# Patient Record
Sex: FEMALE | Race: WHITE | NOT HISPANIC OR LATINO | Employment: OTHER | URBAN - METROPOLITAN AREA
[De-identification: names, ages, dates, MRNs, and addresses within clinical notes are randomized per-mention and may not be internally consistent; named-entity substitution may affect disease eponyms.]

---

## 2017-02-15 ENCOUNTER — LAB CONVERSION - ENCOUNTER (OUTPATIENT)
Dept: OTHER | Facility: OTHER | Age: 67
End: 2017-02-15

## 2017-02-15 LAB
CHOLEST SERPL-MCNC: 181 MG/DL (ref 125–200)
CHOLEST/HDLC SERPL: 3.9 (CALC)
HDLC SERPL-MCNC: 47 MG/DL
LDL CHOLESTEROL (HISTORICAL): 104 MG/DL (CALC)
NON-HDL-CHOL (CHOL-HDL) (HISTORICAL): 134 MG/DL (CALC)
TRIGL SERPL-MCNC: 151 MG/DL

## 2017-02-16 ENCOUNTER — ALLSCRIPTS OFFICE VISIT (OUTPATIENT)
Dept: OTHER | Facility: OTHER | Age: 67
End: 2017-02-16

## 2017-02-27 ENCOUNTER — GENERIC CONVERSION - ENCOUNTER (OUTPATIENT)
Dept: OTHER | Facility: OTHER | Age: 67
End: 2017-02-27

## 2017-06-29 ENCOUNTER — HOSPITAL ENCOUNTER (OUTPATIENT)
Dept: RADIOLOGY | Facility: HOSPITAL | Age: 67
Discharge: HOME | End: 2017-06-29
Attending: OBSTETRICS & GYNECOLOGY
Payer: COMMERCIAL

## 2017-06-29 DIAGNOSIS — Z12.31 ENCOUNTER FOR SCREENING MAMMOGRAM FOR MALIGNANT NEOPLASM OF BREAST: ICD-10-CM

## 2017-06-29 PROCEDURE — G0202 SCR MAMMO BI INCL CAD: HCPCS

## 2017-09-01 LAB
A/G RATIO (HISTORICAL): 1.8 (CALC) (ref 1–2.5)
ALBUMIN SERPL BCP-MCNC: 4.4 G/DL (ref 3.6–5.1)
ALP SERPL-CCNC: 67 U/L (ref 33–130)
ALT SERPL W P-5'-P-CCNC: 24 U/L (ref 6–29)
AST SERPL W P-5'-P-CCNC: 18 U/L (ref 10–35)
BILIRUB SERPL-MCNC: 0.7 MG/DL (ref 0.2–1.2)
BUN SERPL-MCNC: 12 MG/DL (ref 7–25)
BUN/CREA RATIO (HISTORICAL): ABNORMAL (CALC) (ref 6–22)
CALCIUM (ADJUSTED FOR ALBUMIN) (HISTORICAL): 9.5 MG/DL (CALC) (ref 8.6–10.2)
CALCIUM SERPL-MCNC: 9.5 MG/DL (ref 8.6–10.4)
CHLORIDE SERPL-SCNC: 103 MMOL/L (ref 98–110)
CHOLEST SERPL-MCNC: 210 MG/DL
CHOLEST/HDLC SERPL: 3.8 (CALC)
CO2 SERPL-SCNC: 27 MMOL/L (ref 20–31)
CREAT SERPL-MCNC: 0.74 MG/DL (ref 0.5–0.99)
EGFR AFRICAN AMERICAN (HISTORICAL): 98 ML/MIN/1.73M2
EGFR-AMERICAN CALC (HISTORICAL): 84 ML/MIN/1.73M2
EST. AVERAGE GLUCOSE BLD GHB EST-MCNC: 114 (CALC)
EST. AVERAGE GLUCOSE BLD GHB EST-MCNC: 6.3 (CALC)
GAMMA GLOBULIN (HISTORICAL): 2.4 G/DL (CALC) (ref 1.9–3.7)
GLUCOSE (HISTORICAL): 109 MG/DL (ref 65–99)
HBA1C MFR BLD HPLC: 5.6 % OF TOTAL HGB
HDLC SERPL-MCNC: 56 MG/DL
LDL CHOLESTEROL (HISTORICAL): 123 MG/DL (CALC)
NON-HDL-CHOL (CHOL-HDL) (HISTORICAL): 154 MG/DL (CALC)
POTASSIUM SERPL-SCNC: 3.8 MMOL/L (ref 3.5–5.3)
SODIUM SERPL-SCNC: 141 MMOL/L (ref 135–146)
TOTAL PROTEIN (HISTORICAL): 6.8 G/DL (ref 6.1–8.1)
TRIGL SERPL-MCNC: 185 MG/DL
TSH SERPL DL<=0.05 MIU/L-ACNC: 0.99 MIU/L (ref 0.4–4.5)

## 2017-09-06 ENCOUNTER — ALLSCRIPTS OFFICE VISIT (OUTPATIENT)
Dept: OTHER | Facility: OTHER | Age: 67
End: 2017-09-06

## 2017-10-20 ENCOUNTER — ALLSCRIPTS OFFICE VISIT (OUTPATIENT)
Dept: OTHER | Facility: OTHER | Age: 67
End: 2017-10-20

## 2017-10-20 PROCEDURE — G0145 SCR C/V CYTO,THINLAYER,RESCR: HCPCS | Performed by: OBSTETRICS & GYNECOLOGY

## 2017-10-21 NOTE — PROGRESS NOTES
Assessment  1  Pap smear, as part of routine gynecological examination (V76 2) (Z01 419)   2  Encounter for screening mammogram for breast cancer (V76 12) (Z12 31)    Pelvic exam reveals no masses in the pelvis  The vaginal cuff is well supported but there is a cystocele rectocele present  There is no blood or discharge in the vagina  The vulva is normal  Rectal exam shows no masses or blood in the rectum and no nodularity in the cul-de-sac  Breast exam is normal      Plan  Encounter for screening mammogram for breast cancer    · Follow-up visit in 1 year Evaluation and Treatment  Follow-up  Status: Hold For -  Scheduling  Requested for: 37WGL8566   Ordered; For: Encounter for screening mammogram for breast cancer; Ordered By: Yesika Moser Performed:  Due: 15ZAJ2929  Encounter for screening mammogram for malignant neoplasm of breast    · * MAMMO SCREENING BILATERAL W CAD; Status:Active - Retrospective Authorization; Requested YCR:[id number]; Perform:[facility]; NMW:[id number]; Last Updated Vick Cunha; 10/20/2017 10:37:19 AM;Ordered;For:Encounter for screening mammogram for malignant neoplasm of breast; Ordered By:Mykel Flores;  Pap smear, as part of routine gynecological examination    · (1) THIN PREP PAP WITH IMAGING; Status:Active - Retrospective Authorization; Requested ROYCE:[id number];    Perform:[facility]; WDB:[id number]; Last Updated By:Sherry Liu; 10/20/2017 11:07:37 AM;Ordered; For:Pap smear, as part of routine gynecological examination; Ordered By:Mykel Flores; Maturation index required? : No  : hysterectomy  HPV? : if ASCUS    A vaginal Pap smear was taken at this visit  The patient was given a slip for bilateral screening mammogram to be performed in June or July 2018  She can return in 2 years for follow-up GYN evaluation  Discussion/Summary    The patient was told that her breast exam is normal and that she does have a cystocele rectocele        Chief Complaint  annual exam no problems      History of Present Illness  HPI: This 72-year-old patient has had no vaginal bleeding or episodes of vaginitis over the past year  She has no chronic headaches or fainting spells  She does have occasional hot flashes  Her bowel function is normal bladder function is normal  She does not wear liners or pads every day  She does help take care of 80-year-old mother 20-year-old mother-in-law  GYN HM, Adult Female  Candice:   General Health:   Lifestyle:  She consumes alcohol -- She denies drug use  Reproductive health:  she is not sexually active  -- pregnancy history: G 2P 2,-- 2--   hysterectomy  Screening: Cervical cancer screening includes a pap smear performed 10/9/2015  Breast cancer screening includes a mammogram performed 6/29/2017  Colorectal cancer screening includes a colonoscopy performed 8/13/2012  Review of Systems    Constitutional: No fever, no chills, feels well, no tiredness, no recent weight gain or loss  ENT: no ear ache, no loss of hearing, no nosebleeds or nasal discharge, no sore throat or hoarseness  Cardiovascular: no complaints of slow or fast heart rate, no chest pain, no palpitations, no leg claudication or lower extremity edema  Respiratory: no complaints of shortness of breath, no wheezing, no dyspnea on exertion, no orthopnea or PND  Breasts: no complaints of breast pain, breast lump or nipple discharge  Gastrointestinal: no complaints of abdominal pain, no constipation, no nausea or diarrhea, no vomiting, no bloody stools  Genitourinary: no complaints of dysuria, no incontinence, no pelvic pain, no dysmenorrhea, no vaginal discharge or abnormal vaginal bleeding  Musculoskeletal: no complaints of arthralgia, no myalgia, no joint swelling or stiffness, no limb pain or swelling  Integumentary: no complaints of skin rash or lesion, no itching or dry skin, no skin wounds     Neurological: no complaints of headache, no confusion, no numbness or tingling, no dizziness or fainting  Active Problems  1  Allergic rhinitis (477 9) (J30 9)   2  Diverticulosis (562 10) (K57 90)   3  Encounter for screening mammogram for breast cancer (V76 12) (Z12 31)   4  Encounter for screening mammogram for malignant neoplasm of breast (V76 12)   (Z12 31)   5  Esophageal reflux (530 81) (K21 9)   6  Hyperlipidemia (272 4) (E78 5)   7  Hypertension (401 9) (I10)   8  Hypothyroidism (244 9) (E03 9)   9  Impaired fasting glucose (790 21) (R73 01)   10  Mild intermittent asthma without complication (154 78) (L47 43)   11  Numbness and tingling of foot (782 0) (R20 2)   12  Pap smear, as part of routine gynecological examination (V76 2) (Z01 419)   13   Welcome to Medicare preventive visit (V70 0) (Z00 00)    Past Medical History   · History of Abdominal pain (789 00) (R10 9)   · History of Abnormal EKG (794 31) (R94 31)   · History of Acute sinusitis (461 9) (J01 90)   · History of Chest skin lesion (709 9) (L98 9)   · History of Contact dermatitis due to poison ivy (692 6) (L23 7)   · History of Depression with anxiety (300 4) (F41 8)   · History of Fracture Of The Patella (822 0)   · History of Hearing Loss (389 9)   · History of acute bronchitis (V12 69) (Z87 09)   · History of acute bronchitis with bronchospasm (V12 69) (Z87 09)   · History of bronchitis (V12 69) (Z87 09)   · History of eczema (V13 3) (Z87 2)   · History of folliculitis (Q07 1) (U49 1)   · History of streptococcal pharyngitis (V12 09) (Z87 09)   · History of Limb swelling (729 81) (M79 89)   · History of Patient denies drug use   · History of Plantar fasciitis (728 71) (M72 2)   · History of Previous Pregnancies Resulted In 2  Living Children   · History of Shoulder joint pain, unspecified laterality   · History of Skin lesion (709 9) (L98 9)    Surgical History   · History of Colonoscopy (Fiberoptic)   · History of Complete Colonoscopy   · History of Diagnostic Cystoscopy   · History of Hysterectomy   · History of Laminectomy Lumbar    Family History  Mother    · Family history of Coronary Artery Disease (V17 49)   · Denied: Family history of Depression   · Family history of Hypertension (V17 49)   · Family history of Irritable Bowel Syndrome   · No family history of alcoholism (V49 89) (Z78 9)   · Denied: Family history of Patient denies drug use  Father    · Denied: Family history of Depression   · Family history of Diabetes Mellitus (V18 0)   · Family history of Hypertension (V17 49)   · Family history of Lung Cancer (V16 1)   · No family history of alcoholism (V49 89) (Z78 9)   · Denied: Family history of Patient denies drug use   · Family history of Serum Total Cholesterol Was Elevated  Child    · No family history of alcoholism (V49 89) (Z78 9)   · Denied: Family history of Patient denies drug use  Sibling    · No family history of alcoholism (V49 89) (Z78 9)   · Denied: Family history of Patient denies drug use  Brother    · Denied: Family history of Anxiety (Symptom)   · Family history of Colon Cancer (V16 0)   · Denied: Family history of Depression  Family History    · Family history of Colon Cancer (V16 0)   · Family history of Diabetes Mellitus (V18 0)    Social History   · Alcohol   · 2 glasses wine/daily   · Being A Social Drinker   · Former smoker (P43 23) (I15 283)   · Two children    Current Meds   1  Advair Diskus 250-50 MCG/DOSE Inhalation Aerosol Powder Breath Activated; INHALE   1 PUFF TWICE DAILY PRN  RINSE MOUTH AFTER USE; Therapy: 58NQK7515 to (Evaluate:96Fbv3726)  Requested for: 20KSC5765 Recorded   2  Aspirin 81 MG TABS; TABS PO;   Therapy: 08GAZ3614 to (Last Rx:11Oct2014) Ordered   3  Aspirin EC 81 MG Oral Tablet Delayed Release; take 1 tab daily; Therapy: 32XUT3659 to Recorded   4  Calcium 600+D 600-400 MG-UNIT Oral Tablet; take 1 tab daily; Therapy: 61RMH6905 to (Evaluate:05Jan2015) Recorded   5  Fish Oil 1200 MG Oral Capsule; take 1 caps  BID;    Therapy: (Recorded:01Cxj5145) to Recorded   6  Levothyroxine Sodium 112 MCG Oral Tablet; Take 1 tablet daily; Therapy: 48YBE7443 to (Last Rx:55Wgg9681)  Requested for: 64Phh6296 Ordered   7  Losartan Potassium-HCTZ 100-12 5 MG Oral Tablet; Take 1 tablet daily; Therapy: 89CIQ1418 to (Last Iona Pink)  Requested for: 13PTD2241 Ordered   8  PriLOSEC OTC 20 MG Oral Tablet Delayed Release; take 1 tablet daily prn; Therapy: 15VYL7352 to (Last Rx:87Zcr3315) Ordered   9  ProAir  (90 Base) MCG/ACT Inhalation Aerosol Solution; inhale 2 puffs every 4   hours as needed for cough and wheeze; Therapy: 43IGB9342 to (Evaluate:54Dsy4520)  Requested for: 26Czo0149; Last   Rx:49Ftx2726 Ordered   10  Red Yeast Rice 600 MG Oral Capsule; take 1 caps  BID; Last Rx:91Vaw5221 Ordered   11  Vitamin B Complex Oral Tablet; TAKE 1 TABLET DAILY; Therapy: 18UKR4939 to (Evaluate:13Mar2016); Last Rx:61Jfv5900 Ordered   12  Vitamin C 500 MG Oral Tablet Recorded   13  ZyrTEC Allergy 10 MG Oral Tablet; TAKE 1 TABLET DAILY; Therapy: (Recorded:32Suw2782) to Recorded    Allergies  1  No Known Drug Allergies    Vitals   Recorded: 09PEX5273 18:36QA   Systolic 237   Diastolic 78   Height 5 ft 7 in   Weight 206 lb    BMI Calculated 32 26   BSA Calculated 2 05   LMP hysterectomy     Physical Exam    Constitutional   General appearance: No acute distress, well appearing and well nourished  Neck   Neck: Normal, supple, trachea midline, no masses  Thyroid: Normal, no thyromegaly  Pulmonary   Respiratory effort: No increased work of breathing or signs of respiratory distress  Auscultation of lungs: Clear to auscultation  Cardiovascular   Auscultation of heart: Normal rate and rhythm, normal S1 and S2, no murmurs  Peripheral vascular exam: Normal pulses Throughout  Genitourinary   External genitalia: Normal and no lesions appreciated  Vagina: Abnormal  -- Cystocele and rectocele introitus     Urethra: Normal     Urethral meatus: Normal     Bladder: Normal, soft, non-tender and no prolapse or masses appreciated  Cervix: Surgically absent  Uterus: Surgically absent  Adnexa/parametria: Normal, non-tender and no fullness or masses appreciated  Anus, perineum, and rectum: Normal sphincter tone, no masses, and no prolapse  Chest   Breasts: Normal and no dimpling or skin changes noted  Abdomen   Abdomen: Normal, non-tender, and no organomegaly noted  Liver and spleen: No hepatomegaly or splenomegaly  Examination for hernias: No hernias appreciated  Lymphatic   Palpation of lymph nodes in neck, axillae, groin and/or other locations: No lymphadenopathy or masses noted  Skin   Skin and subcutaneous tissue: Normal skin turgor and no rashes  Palpation of skin and subcutaneous tissue: Normal     Psychiatric   Orientation to person, place, and time: Normal     Mood and affect: Normal        Future Appointments    Date/Time Provider Specialty Site   03/07/2018 09:00 AM RHIANNA Combs   Family Medicine [address] [place] [address]     Signatures   Electronically signed by : Ryan Arce MD; Oct 20 2017 11:32AM EST                       (Author)

## 2017-10-22 ENCOUNTER — LAB REQUISITION (OUTPATIENT)
Dept: LAB | Facility: HOSPITAL | Age: 67
End: 2017-10-22
Payer: COMMERCIAL

## 2017-10-22 DIAGNOSIS — Z01.419 ENCOUNTER FOR GYNECOLOGICAL EXAMINATION WITHOUT ABNORMAL FINDING: ICD-10-CM

## 2017-10-30 LAB
LAB AP GYN PRIMARY INTERPRETATION: NORMAL
Lab: NORMAL

## 2017-10-31 ENCOUNTER — GENERIC CONVERSION - ENCOUNTER (OUTPATIENT)
Dept: OTHER | Facility: OTHER | Age: 67
End: 2017-10-31

## 2018-01-09 NOTE — RESULT NOTES
Verified Results  (1) THIN PREP PAP WITH IMAGING 52GJO2193 05:20PM Ki Silverman     Test Name Result Flag Reference   LAB AP CASE REPORT (Report)     Gynecologic Cytology Report            Case: [id number]                  Authorizing Provider: Audi Ugalde MD     Collected:      10/20/2017           First Screen:     PATRICIO Khalil    Received:      10/23/2017 1116        Specimen:  LIQUID-BASED PAP, SCREENING, Vaginal   LAB AP GYN PRIMARY INTERPRETATION      Negative for intraepithelial lesion or malignancy  Electronically signed by PATRICIO Khalil on 10/30/2017 at 5:20 PM   LAB AP GYN SPECIMEN ADEQUACY      Satisfactory for evaluation  Endocervical/transformation zone component present  LAB AP GYN ADDITIONAL INFORMATION (Report)     [employer]'s FDA approved ,  and ThinPrep Imaging System are   utilized with strict adherence to the 's instruction manual to   prepare gynecologic and non-gynecologic cytology specimens for the   production of ThinPrep slides as well as for gynecologic ThinPrep imaging  These processes have been validated by our laboratory and/or by the     The Pap test is not a diagnostic procedure and should not be used as the   sole means to detect cervical cancer  It is only a screening procedure to   aid in the detection of cervical cancer and its precursors  Both   false-negative and false-positive results have been experienced  Your   patient's test result should be interpreted in this context together with   the history and clinical findings     LAB AP LMP hysterectomy     hysterectomy

## 2018-01-11 NOTE — RESULT NOTES
Verified Results  * MAMMO SCREENING BILATERAL W CAD 50UZF4253 02:14PM Enma Hobson Order Number: [id number]    - Patient Instructions: To schedule this appointment, please contact Central Scheduling at [phone]  Do not wear any perfume, powder, lotion or deodorant on breast or underarm area  Please bring your doctors order, referral (if needed) and insurance information with you on the day of the test  Failure to bring this information may result in this test being rescheduled  Arrive 15 minutes prior to your appointment time to register  On the day of your test, please bring any prior mammogram or breast studies with you that were not performed at a [facility]  Failure to bring prior exams may result in your test needing to be rescheduled   Order Number: [id number]    - Patient Instructions: To schedule this appointment, please contact Central Scheduling at [phone]  Do not wear any perfume, powder, lotion or deodorant on breast or underarm area  Please bring your doctors order, referral (if needed) and insurance information with you on the day of the test  Failure to bring this information may result in this test being rescheduled  Arrive 15 minutes prior to your appointment time to register  On the day of your test, please bring any prior mammogram or breast studies with you that were not performed at a [facility]  Failure to bring prior exams may result in your test needing to be rescheduled   Order Number: [id number]    - Patient Instructions: To schedule this appointment, please contact Central Scheduling at [phone]  Do not wear any perfume, powder, lotion or deodorant on breast or underarm area  Please bring your doctors order, referral (if needed) and insurance information with you on the day of the test  Failure to bring this information may result in this test being rescheduled   Arrive 15 minutes prior to your appointment time to register  On the day of your test, please bring any prior mammogram or breast studies with you that were not performed at a [facility]  Failure to bring prior exams may result in your test needing to be rescheduled  Test Name Result Flag Reference   MAMMO SCREENING BILATERAL W CAD (Report)     Patient History:   Patient is postmenopausal    No known family history of cancer  Patient is a former smoker, and smoked for 25 years  Patient's    BMI is 30 4  Reason for exam: screening, asymptomatic  Mammo Screening Bilateral W CAD: June 29, 2017 - Check In #:    [de-identified]   Bilateral MLO and CC view(s) were taken  Technologist: RT Wesley(R)(M)   Prior study comparison: October 9, 2015, bilateral digital    screening mammogram performed at [address] [place] [address]     Daniella 3, 2013, bilateral digital screening mammogram performed at   [address] [place] [address]      The breast tissue is almost entirely fat  No dominant soft tissue mass, architectural distortion or    suspicious calcifications are noted  The skin and nipple    contours are within normal limits  No evidence of malignancy  No significant changes   when compared with prior studies  ACR BI-RADSï¾® Assessments: BiRad:1 - Negative     Recommendation:   Routine screening mammogram of both breasts in 1 year  A    reminder letter will be sent  Analyzed by CAD     The patient is scheduled in a reminder system  8-10% of cancers will be missed on mammography  Management of a    palpable abnormality must be based on clinical grounds  Patients   will be notified of their results via letter from our facility  Accredited by [employer] of Radiology and FDA       Transcription Location: HENRI Levy 98: [id number]     Risk Value(s):   Tyrer-Cuzick 10 Year: 2 800%, Tyrer-Cuzick Lifetime: 5 700%,    Myriad Table: 1 5%, SALUD 5 Year: 1 7%, NCI Lifetime: 6 1%   Signed by: Abigail Rodriguez MD   6/29/17

## 2018-01-12 VITALS
WEIGHT: 209.4 LBS | TEMPERATURE: 98.6 F | RESPIRATION RATE: 16 BRPM | HEIGHT: 67 IN | SYSTOLIC BLOOD PRESSURE: 130 MMHG | HEART RATE: 68 BPM | DIASTOLIC BLOOD PRESSURE: 86 MMHG | BODY MASS INDEX: 32.87 KG/M2

## 2018-01-13 VITALS
BODY MASS INDEX: 31.55 KG/M2 | HEART RATE: 56 BPM | WEIGHT: 201 LBS | TEMPERATURE: 98.2 F | HEIGHT: 67 IN | RESPIRATION RATE: 16 BRPM | DIASTOLIC BLOOD PRESSURE: 80 MMHG | SYSTOLIC BLOOD PRESSURE: 132 MMHG

## 2018-01-14 VITALS
BODY MASS INDEX: 32.33 KG/M2 | WEIGHT: 206 LBS | DIASTOLIC BLOOD PRESSURE: 78 MMHG | HEIGHT: 67 IN | SYSTOLIC BLOOD PRESSURE: 144 MMHG

## 2018-01-14 NOTE — RESULT NOTES
Message   Call patient  Stress echocardiogram - normal study  Verified Results  ECHO STRESS TEST W CONTRAST IF INDICATED [id number] 02:44PM Nayeli Segura Order Number: [id number]     Test Name Result Flag Reference   ECHO STRESS TEST W CONTRAST IF INDICATED (Report)     [address]   [address], [address] [facility]   [phone]     Exercise Stress Echocardiography     Study date: 23-May-2016     Patient: Toni Garcia   MR number: [id number]   Account number: [de-identified]   : [id number]   Age: 72 years   Gender: Female   Study date: 23-May-2016   Status: Outpatient   Location: [address] [place] [address] [facility] lab   Height: 68 in   Weight: 208 lb   BP: 140// 100 mmHg     Indications: Abnormal EKG     Diagnosis: R94 31 - Abnormal electrocardiogram [ECG] [EKG]     Sonographer: JAMIR Sanchez   Primary Physician: Jose Velez MD   Referring Physician: Jose Velez MD   Group: [facility]   Interpreting Physician: Christiano Hess MD     IMPRESSIONS:   Normal study after maximal exercise  Left ventricular systolic function was   normal      SUMMARY     STRESS RESULTS:   Duration of exercise was 6 min and 0 sec  Maximal work rate was 7 METs  Functional capacity was slightly decreased (20% to 30%)  Maximal heart rate during stress was 136 bpm ( 87 % of maximal predicted heart   rate)  Target heart rate was achieved  There was resting hypertension with a hypertensive blood pressure response to   stress  There was no chest pain during stress  ECG CONCLUSIONS:   The stress ECG was negative for ischemia  BASELINE:   There were no regional wall motion abnormalities  Estimated left ventricular ejection fraction was 65 %   PEAK STRESS:   There were no regional wall motion abnormalities  ECHO CONCLUSIONS:   There was no echocardiographic evidence for stress-induced ischemia       HISTORY: HTN, Shortness of breath, GERD     REST ECG: Normal sinus rhythm  PROCEDURE: The study was performed in the stress lab  The study was performed   in the [address] [facility] [place]  The procedure was explained to the   patient and informed consent was obtained  Treadmill exercise testing was   performed, using the Jennifer protocol  Stress and rest echocardiographic   evaluation was performed from multiple acoustic windows for evaluation of   ventricular function  JENNIFER PROTOCOL:   HR bpm SBP mmHg DBP mmHg Symptoms Rhythm/conduct   Baseline 82 140 100 none --   Stage 1 121 -- -- -- --   Stage 2 136 180 90 -- --   Stage 3 136 -- -- -- --   Recovery 1 93 140 78 -- rare PAC's     MEDICATIONS GIVEN: No medications or fluids given  STRESS RESULTS: Duration of exercise was 6 min and 0 sec  The patient exercised   to protocol stage 3  Maximal work rate was 7 METs  Functional capacity was   slightly decreased (20% to 30%)  Maximal heart rate during stress was 136 bpm (   87 % of maximal predicted heart rate)  Target heart rate was achieved  The   heart rate response to stress was normal  Maximal systolic blood pressure   during stress was 180 mmHg  There was resting hypertension with a hypertensive   blood pressure response to stress  The rate-pressure product for the peak heart   rate and blood pressure was 13938  There was no chest pain during stress  The   stress test was terminated due to dyspnea  ECG CONCLUSIONS: The stress ECG was negative for ischemia  Arrhythmia during   stress: isolated atrial premature beats  STRESS 2D ECHO RESULTS:     BASELINE: There were no regional wall motion abnormalities  Left ventricular   size was normal  Overall left ventricular systolic function was normal    Estimated left ventricular ejection fraction was 65 %   PEAK STRESS: There were no regional wall motion abnormalities  There was an   appropriate reduction in left ventricular size   There was an appropriate augmentation in LV function  ECHO CONCLUSIONS: There was no echocardiographic evidence for stress-induced   ischemia  The image quality was excellent       Prepared and electronically signed by     Emilia Fothergill, MD   Signed [id number] 15:55:36

## 2018-01-15 NOTE — PROGRESS NOTES
Assessment    1  Welcome to Medicare preventive visit (V70 0) (Z00 00)   2  Hypertension (401 9) (I10)   3  Hypothyroidism (244 9) (E03 9)   4  Hyperlipidemia (272 4) (E78 5)   5  Skin lesion (709 9) (L98 9)   · R neck   6  Abnormal EKG (794 31) (R94 31)    Plan  Abnormal EKG, Hypertension    · ECHO STRESS TEST W CONTRAST IF INDICATED; Status:Active; Requested  for:29Apr2016; Health Maintenance, Hypertension    · Prevnar 13 Intramuscular Suspension  Welcome to Medicare preventive visit    · EKG/ECG- POC; Status:Complete;   Done: [id number] 10:32AM   · SNELLEN VISION- POC; Status:Complete;   Done: 69KIQ2017    Discussion/Summary    Patient had EKG done today which showed sinus rhythm, 64 bpm, non-specific ST-T wave changes, rule out cardiac ischemia  Will order stress echo  Patient has irritated skin lesion on right lateral neck area  Recommended to schedule appointment for skin lesion removal    Impression: Welcome to Medicare Visit, with preventive exam as well as age and risk appropriate counseling completed  Cardiovascular screening and counseling: the risks and benefits of screening were discussed, screening is current, counseling was given on maintaining a healthy diet, counseling was given on maintaining a healthy weight, counseling was given on ways to improve cholesterol, counseling was given on ways to improve blood pressure and counseling was given on ways to improve exercise tolerance  Diabetes screening and counseling: the risks and benefits of screening were discussed, screening is current, counseling was given on maintaining a healthy diet, counseling was given on maintaining a healthy weight and counseling was given on ways to improve physical activity  Colorectal cancer screening and counseling: the risks and benefits of screening were discussed and colonoscopy done in 2012     Breast cancer screening and counseling: the risks and benefits of screening were discussed, screening is current and mammogram done in 10/15  Cervical cancer screening and counseling: the risks and benefits of screening were discussed, screening is current and Patient had pelvic exam and Pap smear in 9/15  Osteoporosis screening and counseling: the risks and benefits of screening were discussed, counseling was given on obtaining adequate amounts of calcium and vitamin D on a daily basis and counseling was given on the importance of regular weightbearing exercise  Abdominal aortic aneurysm screening and counseling: the risks and benefits of screening were discussed and screening not indicated  Glaucoma screening and counseling: the risks and benefits of screening were discussed and screening is current  HIV screening and counseling: screening not indicated  Immunizations: the risks and benefits of influenza vaccination were discussed with the patient, influenza vaccination is recommended annually, the risks and benefits of pneumococcal vaccination were discussed with the patient, Prevnar 13 administered today, hepatitis B vaccination series is not indicated at this time due to the patient's low risk of linda the disease, the risks and benefits of the Zostavax vaccine were discussed with the patient, recommended zostavax vaccination, the risks and benefits of the Tdap vaccine were discussed with the patient and recommended Tdap vaccination  Advance Directive Planning: not complete, paperwork and instructions were given to the patient  Patient Discussion: plan discussed with the patient, follow-up visit needed in one year  Chief Complaint  Welcome to Medicare wellness exam      History of Present Illness  Welcome to Medicare and Wellness Visits: The patient is being seen for the welcome to medicare visit  Medicare Screening and Risk Factors   Hospitalizations: no previous hospitalizations  Medicare Screening Tests Risk Questions   Abdominal aortic aneurysm risk assessment: none indicated  Osteoporosis risk assessment: , female gender and over 48years of age  HIV risk assessment: none indicated  Drug and Alcohol Use: The patient is a former cigarette smoker and quit smoking 11/1990  The patient reports occasional alcohol use  Alcohol concern:   The patient has no concerns about alcohol abuse  She has never used illicit drugs  Diet and Physical Activity: Current diet includes well balanced meals, 3 servings of fruit per day, 1-2 servings of vegetables per day, 1 servings of meat per day, 1 servings of whole grains per day, 0 servings of dairy products per day and 2 cups of coffee per day  She exercises infrequently  Exercise: walking 4-5 hours per week  Mood Disorder and Cognitive Impairment Screening: PHQ-9 Depression Scale She denies feeling down, depressed, or hopeless over the past two weeks  She denies feeling little interest or pleasure in doing things over the past two weeks  Cognitive impairment screening: denies difficulty learning/retaining new information, denies difficulty handling complex tasks, denies difficulty with reasoning, denies difficulty with spatial ability and orientation, denies difficulty with language, denies difficulty with behavior and MMSE: 30/30  Functional Ability/Level of Safety: Hearing is normal bilaterally, normal in the right ear, normal in the left ear and a hearing aid is not used  The patient is currently able to do activities of daily living without limitations, able to do instrumental activities of daily living without limitations, able to participate in social activities without limitations and able to drive without limitations  Activities of daily living details: does not need help using the phone, no transportation help needed, does not need help shopping, no meal preparation help needed, does not need help doing housework, does not need help doing laundry, does not need help managing medications and does not need help managing money  Fall risk factors:  antihypertensive use, but The patient fell 0 times in the past 12 months , no polypharmacy, no alcohol use, no mobility impairment, no antidepressant use, no deconditioning, no postural hypotension, no sedative use, no visual impairment, no urinary incontinence, no cognitive impairment, up and go test was normal and no previous fall  Home safety risk factors:  no unfamiliar surroundings, no loose rugs, no poor household lighting, no uneven floors, no household clutter, grab bars in the bathroom and handrails on the stairs  Advance Directives: Advance directives: no living will, no durable power of  for health care directives and no advance directives  end of life decisions were reviewed with the patient  Co-Managers and Medical Equipment/Suppliers: See Patient Care Team      Review of Systems    Constitutional: no fever, no chills and no fatigue  Head and Face: no facial pain and no facial pressure  Eyes: no eye pain, eyes not red, no watery discharge from the eyes, no purulent discharge from the eyes, no itching of the eyes and no blurred vision  ENT: no earache, no hearing loss, no nasal congestion, no nasal discharge, no sore throat, no scratchy throat, no hoarseness and no white patches in the mouth  Cardiovascular: no chest pain, no palpitations, the heart is not racing, no lightheadedness and no lower extremity edema  Respiratory: no shortness of breath, no wheezing and no cough  Gastrointestinal: no abdominal pain, no nausea, no diarrhea, no constipation, no bright red blood per rectum and no melena  Genitourinary: no dysuria, no urinary frequency, no urinary urgency, no flank pain, no suprapubic pain, no pelvic pain, no dark urine and no hematuria  Musculoskeletal: no diffuse joint pain and no joint swelling     Integumentary and Breasts: no rashes, no mouth sores, no erythema, no edema, no skin ulcer, no patch, no breast pain and no breast lump    The patient presents with complaints of right neck skin lesion  Neurological: no headache, no confusion, no dizziness, no fainting, no paresthesias, no leg numbness, no leg weakness, no tingling and no difficulty walking  Psychiatric: no insomnia, no anxiety and no depression  Endocrine: no hot flashes and no muscle weakness  Hematologic and Lymphatic: negative  Over the past 2 weeks, how often have you been bothered by the following problems? 1 ) Little interest or pleasure in doing things? Not at all    2 ) Feeling down, depressed or hopeless? Not at all    3 ) Trouble falling asleep or sleeping too much? Several days  4 ) Feeling tired or having little energy? Several days  5 ) Poor appetite or overeating? Half the days or more  6 ) Feeling bad about yourself, or that you are a failure, or have let yourself or your family down? Not at all    7 ) Trouble concentrating on things, such as reading a newspaper or watching television? Not at all    8 ) Moving or speaking so slowly that other people could have noticed, or the opposite, moving or speaking faster than usual? Not at all  How difficult have these problems made it for you to do your work, take care of things at home, or get along with people? Not at all  Score 4      Active Problems    1  Allergic rhinitis (477 9) (J30 9)   2  Asthma (493 90) (J45 909)   3  Diverticulosis (562 10) (K57 90)   4  Encounter for screening mammogram for malignant neoplasm of breast (V76 12)   (Z12 31)   5  Esophageal reflux (530 81) (K21 9)   6  Hyperlipidemia (272 4) (E78 5)   7  Hypertension (401 9) (I10)   8  Hypothyroidism (244 9) (E03 9)   9  Impaired fasting glucose (790 21) (R73 01)   10  Numbness and tingling of foot (782 0) (R20 2)   11   Pap smear, as part of routine gynecological examination (V76 2) (Z01 419)    Past Medical History    · History of Abdominal pain (789 00) (R10 9)   · History of Acute sinusitis (461 9) (J01 90)   · History of Contact dermatitis due to poison ivy (692 6) (L23 7)   · History of Depression with anxiety (300 4) (F41 8)   · History of Fracture Of The Patella (822 0)   · History of Hearing Loss (389 9)   · History of acute bronchitis (V12 69) (Z87 09)   · History of bronchitis (V12 69) (Z87 09)   · History of eczema (V13 3) (Z87 2)   · History of folliculitis (C84 1) (R50 4)   · History of streptococcal pharyngitis (V12 09) (Z87 09)   · History of streptococcal pharyngitis (V12 09) (Z87 09)   · History of Limb swelling (729 81) (M79 89)   · History of Plantar fasciitis (728 71) (M72 2)   · History of Previous Pregnancies Resulted In 2  Living Children   · History of Shoulder joint pain, unspecified laterality    The active problems and past medical history were reviewed and updated today  Surgical History    · History of Colonoscopy (Fiberoptic)   · History of Complete Colonoscopy   · History of Diagnostic Cystoscopy   · History of Hysterectomy   · History of Laminectomy Lumbar    The surgical history was reviewed and updated today  Family History  Mother    · Family history of Coronary Artery Disease (V17 49)   · Family history of Depression   · Family history of Hypertension (V17 49)   · Family history of Irritable Bowel Syndrome  Father    · Family history of Depression   · Family history of Diabetes Mellitus (V18 0)   · Family history of Hypertension (V17 49)   · Family history of Lung Cancer (V16 1)   · Family history of Serum Total Cholesterol Was Elevated  Brother    · Family history of Anxiety (Symptom)   · Family history of Colon Cancer (V16 0)   · Family history of Depression  Family History    · Family history of Colon Cancer (V16 0)   · Family history of Diabetes Mellitus (V18 0)    The family history was reviewed and updated today         Social History    · Alcohol   · 2 glasses wine/daily   · Being A Social Drinker   · Former smoker (B90 23) (D18 098)   · Two children  The social history was reviewed and updated today  Current Meds   1  Advair Diskus 250-50 MCG/DOSE Inhalation Aerosol Powder Breath Activated; INHALE   1 PUFF TWICE DAILY  RINSE MOUTH AFTER USE; Therapy: 75FXT3749 to (Rhiannon James)  Requested for: 12Jun2014; Last   Rx:04Apr2014 Ordered   2  Aspirin 81 MG TABS; TABS PO;   Therapy: 14BFZ2242 to (Last Rx:11Oct2014) Ordered   3  Aspirin EC 81 MG Oral Tablet Delayed Release; take 1 tab daily; Therapy: 47OSC5892 to Recorded   4  Calcium 600+D 600-400 MG-UNIT Oral Tablet; take 1 tab daily; Therapy: 84FLE7871 to (Evaluate:05Jan2015) Recorded   5  Fish Oil 1200 MG Oral Capsule; take 1 caps  daily; Therapy: (Recorded:17Cps0125) to Recorded   6  Levothyroxine Sodium 112 MCG Oral Tablet; Take 1 tablet daily; Therapy: 59XZG8808 to (Last Rx:43Lts3998)  Requested for: 20Feb2016 Ordered   7  Losartan Potassium-HCTZ 100-12 5 MG Oral Tablet; Take 1 tablet daily; Therapy: 54EDV8106 to (Last Rx:30Nov2015)  Requested for: 31XXP8543 Ordered   8  PriLOSEC OTC 20 MG Oral Tablet Delayed Release; take 1 tablet daily prn; Therapy: 21ATW5752 to (Last Rx:12Feb2016) Ordered   9  ProAir  (90 Base) MCG/ACT Inhalation Aerosol Solution; inhale 2 puffs every 4   hours as needed for cough and wheeze; Therapy: 30RUG7881 to (Evaluate:13May2015)  Requested for: 39WWM0217 Recorded   10  Red Yeast Rice CAPS Recorded   11  Vitamin B Complex Oral Tablet; TAKE 1 TABLET DAILY; Therapy: 28SQM2365 to (Evaluate:13Mar2016); Last Rx:74Phd6547 Ordered   12  Vitamin C 500 MG Oral Tablet Recorded   13  ZyrTEC Allergy 10 MG Oral Tablet; TAKE 1 TABLET DAILY; Therapy: (Recorded:72Lrp4481) to Recorded    The medication list was reviewed and updated today  Allergies    1   No Known Drug Allergies    Immunizations   1 2    Influenza  [id number]    Pneumococcal  [id number]      Vitals  Signs [Data Includes: Current Encounter]    Temperature: 97 7 F  Heart Rate: 82  Respiration: 18  Systolic: 887  Diastolic: 78  Height: 5 ft 7 in  Weight: 207 lb 2 0 oz  BMI Calculated: 32 44  BSA Calculated: 2 05  O2 Saturation: 97  Pain Scale: 0    Physical Exam    Constitutional   General appearance: No acute distress, well appearing and well nourished  Obese  Head and Face   Head and face: Normal     Palpation of the face and sinuses: No sinus tenderness  Eyes   Conjunctiva and lids: No swelling, erythema or discharge  Pupils and irises: Equal, round, reactive to light  Ears, Nose, Mouth, and Throat   External inspection of ears and nose: Normal     Otoscopic examination: Tympanic membranes translucent with normal light reflex  Canals patent without erythema  Hearing: Normal     Nasal mucosa, septum, and turbinates: Normal without edema or erythema  Lips, teeth, and gums: Normal, good dentition  Oropharynx: Normal with no erythema, edema, exudate or lesions  Neck   Neck: Supple, symmetric, trachea midline, no masses  Thyroid: Normal, no thyromegaly  Pulmonary   Respiratory effort: No increased work of breathing or signs of respiratory distress  Auscultation of lungs: Clear to auscultation  Cardiovascular   Auscultation of heart: Normal rate and rhythm, normal S1 and S2, no murmurs  Carotid pulses: 2+ bilaterally  no carotid bruits  Abdominal aorta: Normal   no abdominal bruits  Pedal pulses: 2+ bilaterally  Examination of extremities for edema and/or varicosities: Normal     Abdomen   Abdomen: Non-tender, no masses  Liver and spleen: No hepatomegaly or splenomegaly  Lymphatic   Palpation of lymph nodes in neck: No lymphadenopathy  Musculoskeletal   Gait and station: Normal     Digits and nails: Normal without clubbing or cyanosis  Joints, bones, and muscles: Normal     Skin   Skin and subcutaneous tissue: Abnormal    Irritated raised skin lesion 4 x 5 mm on R lateral neck  Neurologic   Cranial nerves: Cranial nerves II-XII intact  Sensation: No sensory loss  Psychiatric   Judgment and insight: Normal     Orientation to person, place, and time: Normal     Recent and remote memory: Intact  Mood and affect: Normal        Results/Data  EKG/ECG- POC 29Apr2016 10:32AM Leslie Hernandez     Test Name Result Flag Reference   EKG/ECG 4/29/2016       *VB - Fall Risk Assessment  (Dx V80 09 Screen for Neurologic Disorder) 29Apr2016 10:19AM Leslie Hernandez     Test Name Result Flag Reference   Fall Risk Assessment [id number]       *VB-Depression Screening 29Apr2016 10:19AM Leslie Hernandez     Test Name Result Flag Reference   Depression Scale Result      Depression Screen - Negative For Symptoms       Rhythm and rate:  ventricular rate is 64 beats per minute  normal sinus rhythm  T waves:   there are nonspecific ST-T wave changes  Comparison to prior ECGs: no prior ECGs were available for comparison  Procedure    Procedure: Visual Acuity Test    Indication: routine screening  Inforrmation supplied by a Snellen chart  Results: 20/20 in both eyes with corrective device, 20/40 in the right eye with corrective device, 20/20 in the left eye with corrective device   Color vision was and the results were normal       Future Appointments    Date/Time Provider Specialty Site   05/06/2016 10:00 AM RHIANNA Morales [facility]   08/16/2016 08:00 AM RHIANNA Morales [facility]   09/30/2016 01:00 PM Nithin Muniz MD Obstetrics/Gynecology [facility] & [address], [address]     Signatures   Electronically signed by : RHIANNA Barksdale ; Apr 29 2016  9:05PM EST                       (Author)

## 2018-03-01 LAB
ALBUMIN SERPL-MCNC: 4.6 G/DL (ref 3.6–5.1)
ALBUMIN/GLOB SERPL: 1.8 (CALC) (ref 1–2.5)
ALP SERPL-CCNC: 69 U/L (ref 33–130)
ALT SERPL-CCNC: 40 U/L (ref 6–29)
AST SERPL-CCNC: 26 U/L (ref 10–35)
BASOPHILS # BLD AUTO: 70 CELLS/UL (ref 0–200)
BASOPHILS NFR BLD AUTO: 0.8 %
BILIRUB SERPL-MCNC: 0.9 MG/DL (ref 0.2–1.2)
BUN SERPL-MCNC: 18 MG/DL (ref 7–25)
BUN/CREAT SERPL: ABNORMAL (CALC) (ref 6–22)
CALCIUM ALBUM COR SERPL-MCNC: 9.6 MG/DL (CALC) (ref 8.6–10.2)
CALCIUM SERPL-MCNC: 9.7 MG/DL (ref 8.6–10.4)
CHLORIDE SERPL-SCNC: 99 MMOL/L (ref 98–110)
CHOLEST SERPL-MCNC: 206 MG/DL
CHOLEST/HDLC SERPL: 3.7 (CALC)
CO2 SERPL-SCNC: 28 MMOL/L (ref 20–31)
CREAT SERPL-MCNC: 0.86 MG/DL (ref 0.5–0.99)
EOSINOPHIL # BLD AUTO: 209 CELLS/UL (ref 15–500)
EOSINOPHIL NFR BLD AUTO: 2.4 %
ERYTHROCYTE [DISTWIDTH] IN BLOOD BY AUTOMATED COUNT: 13.1 % (ref 11–15)
GLOBULIN SER CALC-MCNC: 2.6 G/DL (CALC) (ref 1.9–3.7)
GLUCOSE SERPL-MCNC: 105 MG/DL (ref 65–99)
HCT VFR BLD AUTO: 41.4 % (ref 35–45)
HDLC SERPL-MCNC: 55 MG/DL
HGB BLD-MCNC: 14.2 G/DL (ref 11.7–15.5)
LDLC SERPL CALC-MCNC: 125 MG/DL (CALC)
LYMPHOCYTES # BLD AUTO: 1740 CELLS/UL (ref 850–3900)
LYMPHOCYTES NFR BLD AUTO: 20 %
MCH RBC QN AUTO: 29.5 PG (ref 27–33)
MCHC RBC AUTO-ENTMCNC: 34.3 G/DL (ref 32–36)
MCV RBC AUTO: 86.1 FL (ref 80–100)
MONOCYTES # BLD AUTO: 713 CELLS/UL (ref 200–950)
MONOCYTES NFR BLD AUTO: 8.2 %
NEUTROPHILS # BLD AUTO: 5968 CELLS/UL (ref 1500–7800)
NEUTROPHILS NFR BLD AUTO: 68.6 %
NONHDLC SERPL-MCNC: 151 MG/DL (CALC)
PLATELET # BLD AUTO: 354 THOUSAND/UL (ref 140–400)
PMV BLD REES-ECKER: 10 FL (ref 7.5–12.5)
POTASSIUM SERPL-SCNC: 3.7 MMOL/L (ref 3.5–5.3)
PROT SERPL-MCNC: 7.2 G/DL (ref 6.1–8.1)
RBC # BLD AUTO: 4.81 MILLION/UL (ref 3.8–5.1)
SL AMB EGFR AFRICAN AMERICAN: 81 ML/MIN/1.73M2
SL AMB EGFR NON AFRICAN AMERICAN: 70 ML/MIN/1.73M2
SODIUM SERPL-SCNC: 138 MMOL/L (ref 135–146)
T4 FREE SERPL-MCNC: 1.9 NG/DL (ref 0.8–1.8)
TRIGL SERPL-MCNC: 144 MG/DL
TSH SERPL-ACNC: 0.31 MIU/L (ref 0.4–4.5)
WBC # BLD AUTO: 8.7 THOUSAND/UL (ref 3.8–10.8)

## 2018-03-08 DIAGNOSIS — E03.9 ACQUIRED HYPOTHYROIDISM: Primary | ICD-10-CM

## 2018-03-08 RX ORDER — LEVOTHYROXINE SODIUM 112 UG/1
TABLET ORAL
Qty: 90 TABLET | Refills: 3 | Status: SHIPPED | OUTPATIENT
Start: 2018-03-08 | End: 2019-03-29 | Stop reason: SDUPTHER

## 2018-03-22 RX ORDER — AMOXICILLIN 500 MG
1 CAPSULE ORAL 2 TIMES DAILY
COMMUNITY

## 2018-03-22 RX ORDER — LOSARTAN POTASSIUM AND HYDROCHLOROTHIAZIDE 12.5; 1 MG/1; MG/1
1 TABLET ORAL DAILY
COMMUNITY
Start: 2014-10-10 | End: 2018-12-17 | Stop reason: SDUPTHER

## 2018-03-22 RX ORDER — ALBUTEROL SULFATE 90 UG/1
2 AEROSOL, METERED RESPIRATORY (INHALATION) EVERY 4 HOURS PRN
COMMUNITY
Start: 2014-04-04 | End: 2019-11-25 | Stop reason: SDUPTHER

## 2018-03-22 RX ORDER — CETIRIZINE HYDROCHLORIDE 10 MG/1
1 TABLET ORAL DAILY
COMMUNITY
End: 2021-04-20 | Stop reason: ALTCHOICE

## 2018-03-22 RX ORDER — ASPIRIN 81 MG/1
1 TABLET ORAL DAILY
COMMUNITY
Start: 2014-10-11

## 2018-03-22 RX ORDER — OMEPRAZOLE 20 MG/1
1 TABLET, DELAYED RELEASE ORAL DAILY PRN
COMMUNITY
Start: 2016-02-12 | End: 2021-04-20 | Stop reason: ALTCHOICE

## 2018-03-22 RX ORDER — B-COMPLEX WITH VITAMIN C
1 TABLET ORAL DAILY
COMMUNITY
Start: 2016-02-12 | End: 2018-09-28 | Stop reason: ALTCHOICE

## 2018-03-22 RX ORDER — ASCORBIC ACID 500 MG
TABLET ORAL
COMMUNITY

## 2018-03-22 RX ORDER — AMPICILLIN TRIHYDRATE 250 MG
1 CAPSULE ORAL 2 TIMES DAILY
COMMUNITY
End: 2021-10-22 | Stop reason: ALTCHOICE

## 2018-03-23 ENCOUNTER — OFFICE VISIT (OUTPATIENT)
Dept: FAMILY MEDICINE CLINIC | Facility: CLINIC | Age: 68
End: 2018-03-23
Payer: COMMERCIAL

## 2018-03-23 VITALS
TEMPERATURE: 97.1 F | DIASTOLIC BLOOD PRESSURE: 86 MMHG | BODY MASS INDEX: 30.76 KG/M2 | RESPIRATION RATE: 16 BRPM | OXYGEN SATURATION: 96 % | SYSTOLIC BLOOD PRESSURE: 124 MMHG | HEART RATE: 72 BPM | WEIGHT: 196 LBS | HEIGHT: 67 IN

## 2018-03-23 DIAGNOSIS — I10 ESSENTIAL HYPERTENSION: Primary | ICD-10-CM

## 2018-03-23 DIAGNOSIS — K21.9 GASTROESOPHAGEAL REFLUX DISEASE WITHOUT ESOPHAGITIS: ICD-10-CM

## 2018-03-23 DIAGNOSIS — R73.01 IMPAIRED FASTING GLUCOSE: ICD-10-CM

## 2018-03-23 DIAGNOSIS — L82.1 SEBORRHEIC KERATOSIS: ICD-10-CM

## 2018-03-23 DIAGNOSIS — J45.20 MILD INTERMITTENT ASTHMA WITHOUT COMPLICATION: ICD-10-CM

## 2018-03-23 DIAGNOSIS — E03.9 ACQUIRED HYPOTHYROIDISM: ICD-10-CM

## 2018-03-23 DIAGNOSIS — E78.2 MIXED HYPERLIPIDEMIA: ICD-10-CM

## 2018-03-23 DIAGNOSIS — R10.31 RIGHT GROIN PAIN: ICD-10-CM

## 2018-03-23 DIAGNOSIS — Z12.39 ENCOUNTER FOR SCREENING FOR MALIGNANT NEOPLASM OF BREAST: ICD-10-CM

## 2018-03-23 DIAGNOSIS — Z78.0 MENOPAUSE: ICD-10-CM

## 2018-03-23 PROCEDURE — 99214 OFFICE O/P EST MOD 30 MIN: CPT | Performed by: FAMILY MEDICINE

## 2018-03-23 NOTE — PROGRESS NOTES
Assessment/Plan:    1  HTN - stable  Continue Losartan/HCTZ 100/12 5 mg one tablet daily  Follow a low-sodium diet       2  Hypothyroidism - recommended to take  Levothyroxine 112 mcg one tablet daily on Mon - Friday and 1/2 tablet on Sat  and Sunday  Recheck TSH level in 2 months       3  Hyperlipidemia - improved  Follow a  low cholesterol, low fat diet  Continue Red yeast rice one caps  twice daily, Fish oil 1200 mg twice daily  Encouraged regular exercise, weight reduction  3     3  IFG- recommended to avoid concentrated sweets  Folllow a low carb diet       4 Mild intermittent asthma - stable  No recent asthma exacerbations  Use ProAir HFA inhaler PRN      5  GERD - symptoms improved  Patient stopped taking  Prilosec OTC  Recommended to avoid caffeine, citrus juices, spicy, fried foods       6 Seborrheic keratosis - patient has 2 large lesions on back  Referred to dermatologist for evaluation  7   Right groin pain, rule out muscle strain  Recommended to avoid strenuous activities, heavy lifting  Call office if symptoms persist or worsen  8  - schedule DEXA scan, screening mammogram in 6/18  Schedule follow-up visit in 6 months  Diagnoses and all orders for this visit:    Essential hypertension    Acquired hypothyroidism    Mixed hyperlipidemia    Impaired fasting glucose    Mild intermittent asthma without complication    Gastroesophageal reflux disease without esophagitis        Subjective:      Patient ID: Lino Hamm is a 79 y o  female  HPI     Patient is 49-year-old female with HTN, IFG, Hypothyroidism, Hyperlipidemia, GERD, AR, Asthma  She presents for 6 month followup office visit  Reviewed current medications, blood work results from 2/28/18  TSH 0 31, T4 free 1 9, cholesterol 206, HDL 55, , triglycerides 144 (improved from 185 in 8/17),  Fasting blood sugar 105, creatinine 0 86, potassium 3 7            HTN - patient takes Losartan /HCTZ 100/12 5 mg daily      She denies chest pain, dizziness  She c/o mild stable      exertional shortness of breath  Stress echo done in May 2016 was negative for stress     induced cardiac ischemia  Asthma - symptoms are stable  No recent asthma exacerbations  Patient does not use Advair 250/50 or   ProAir HFA inhaler  Hyperlipidemia - improved  Patient tries to follow a low cholesterol , low fat diet  Currently taking Red yeast Rice one capsule BID, Fish oil 1200 mg one capsule twice daily  Hypothyroidism - currently on Levothyroxine 112 mcg daily  Denies fatigue, constipation, hair loss  No heart palpitations  Patient c/o large brown skin lesions on back  No bleeding, scabbing  C/o intermittent pain in R groin area  Denies R hip pain, back pain  Denies urinary symptoms  No prior history of hernias  Mammogram done in 6/17  Patient had pelvic exam and Pap smear done by gynecologist Dr Abraham Montague in 10/17  Patient did not scheduled DEXA scan as recommended at the last visit  Family history is positive for colon CA  Colonoscopy done in 8/12  Dr Ryland Jefferson recommended to recheck colonoscopy in 5 years  Prevnar 13 done in 4/16  The following portions of the patient's history were reviewed and updated as appropriate: allergies, current medications, past family history, past medical history, past social history, past surgical history and problem list     Review of Systems   Constitutional: Negative for activity change, appetite change, chills, fatigue and fever  HENT: Negative for congestion, ear pain, hearing loss, nosebleeds, sore throat and trouble swallowing  Eyes: Negative for pain, discharge, redness, itching and visual disturbance  Respiratory: Positive for shortness of breath (mild stable exertional SOB)  Negative for cough, chest tightness and wheezing  Cardiovascular: Negative for chest pain, palpitations and leg swelling     Gastrointestinal: Negative for abdominal pain, blood in stool, constipation, diarrhea, nausea and vomiting  No heartburn  Endocrine: Negative for cold intolerance and heat intolerance  Genitourinary: Negative for difficulty urinating, dysuria, frequency, hematuria, pelvic pain, vaginal bleeding and vaginal discharge  Musculoskeletal: Negative for arthralgias, back pain, gait problem, joint swelling and myalgias  Skin: Negative for rash and wound  Neurological: Negative for dizziness, syncope, weakness, numbness and headaches  Hematological: Negative  Psychiatric/Behavioral: Negative  Objective:      /86 (BP Location: Left arm, Patient Position: Sitting, Cuff Size: Large)   Pulse 72   Temp (!) 97 1 °F (36 2 °C) (Tympanic)   Resp 16   Ht 5' 7" (1 702 m)   Wt 88 9 kg (196 lb)   SpO2 96%   BMI 30 70 kg/m²          Physical Exam   Constitutional: She appears well-developed and well-nourished  HENT:   Head: Normocephalic and atraumatic  Right Ear: External ear normal    Left Ear: External ear normal    Nose: Nose normal    Mouth/Throat: Oropharynx is clear and moist    Eyes: Conjunctivae are normal  Pupils are equal, round, and reactive to light  Cardiovascular: Normal rate, regular rhythm and normal heart sounds  No murmur heard  No carotid bruits BL  No BL LE edema BL   Pulmonary/Chest: Effort normal and breath sounds normal  She has no wheezes  She has no rales  Abdominal: Soft  Bowel sounds are normal  There is no tenderness  No hernia in R groin area  Musculoskeletal: Normal range of motion  She exhibits no edema, tenderness or deformity  Skin: Skin is warm and dry  No rash noted  2 large seborrheic keratosis lesions on back    Nursing note and vitals reviewed

## 2018-05-25 LAB — TSH SERPL-ACNC: 1.02 MIU/L (ref 0.4–4.5)

## 2018-06-29 DIAGNOSIS — Z12.31 ENCOUNTER FOR SCREENING MAMMOGRAM FOR MALIGNANT NEOPLASM OF BREAST: ICD-10-CM

## 2018-09-28 ENCOUNTER — OFFICE VISIT (OUTPATIENT)
Dept: FAMILY MEDICINE CLINIC | Facility: CLINIC | Age: 68
End: 2018-09-28
Payer: COMMERCIAL

## 2018-09-28 ENCOUNTER — TRANSCRIBE ORDERS (OUTPATIENT)
Dept: RADIOLOGY | Facility: HOSPITAL | Age: 68
End: 2018-09-28

## 2018-09-28 ENCOUNTER — HOSPITAL ENCOUNTER (OUTPATIENT)
Dept: RADIOLOGY | Facility: HOSPITAL | Age: 68
Discharge: HOME | End: 2018-09-28
Payer: COMMERCIAL

## 2018-09-28 VITALS
HEART RATE: 64 BPM | TEMPERATURE: 97.6 F | SYSTOLIC BLOOD PRESSURE: 116 MMHG | HEIGHT: 67 IN | WEIGHT: 197 LBS | DIASTOLIC BLOOD PRESSURE: 74 MMHG | RESPIRATION RATE: 16 BRPM | BODY MASS INDEX: 30.92 KG/M2

## 2018-09-28 DIAGNOSIS — Z00.00 MEDICARE ANNUAL WELLNESS VISIT, SUBSEQUENT: ICD-10-CM

## 2018-09-28 DIAGNOSIS — R73.01 IMPAIRED FASTING GLUCOSE: ICD-10-CM

## 2018-09-28 DIAGNOSIS — E03.9 ACQUIRED HYPOTHYROIDISM: ICD-10-CM

## 2018-09-28 DIAGNOSIS — R10.31 RIGHT GROIN PAIN: ICD-10-CM

## 2018-09-28 DIAGNOSIS — E78.2 MIXED HYPERLIPIDEMIA: ICD-10-CM

## 2018-09-28 DIAGNOSIS — I10 ESSENTIAL HYPERTENSION: Primary | ICD-10-CM

## 2018-09-28 DIAGNOSIS — J45.20 MILD INTERMITTENT ASTHMA WITHOUT COMPLICATION: ICD-10-CM

## 2018-09-28 DIAGNOSIS — Z23 NEED FOR INFLUENZA VACCINATION: ICD-10-CM

## 2018-09-28 PROCEDURE — 3074F SYST BP LT 130 MM HG: CPT | Performed by: FAMILY MEDICINE

## 2018-09-28 PROCEDURE — 99214 OFFICE O/P EST MOD 30 MIN: CPT | Performed by: FAMILY MEDICINE

## 2018-09-28 PROCEDURE — 1160F RVW MEDS BY RX/DR IN RCRD: CPT

## 2018-09-28 PROCEDURE — 73502 X-RAY EXAM HIP UNI 2-3 VIEWS: CPT

## 2018-09-28 PROCEDURE — 1036F TOBACCO NON-USER: CPT | Performed by: FAMILY MEDICINE

## 2018-09-28 PROCEDURE — 90662 IIV NO PRSV INCREASED AG IM: CPT

## 2018-09-28 PROCEDURE — 1125F AMNT PAIN NOTED PAIN PRSNT: CPT | Performed by: FAMILY MEDICINE

## 2018-09-28 PROCEDURE — 3008F BODY MASS INDEX DOCD: CPT | Performed by: FAMILY MEDICINE

## 2018-09-28 PROCEDURE — 1160F RVW MEDS BY RX/DR IN RCRD: CPT | Performed by: FAMILY MEDICINE

## 2018-09-28 PROCEDURE — 1170F FXNL STATUS ASSESSED: CPT | Performed by: FAMILY MEDICINE

## 2018-09-28 PROCEDURE — G0008 ADMIN INFLUENZA VIRUS VAC: HCPCS

## 2018-09-28 PROCEDURE — 3078F DIAST BP <80 MM HG: CPT | Performed by: FAMILY MEDICINE

## 2018-09-28 PROCEDURE — 4040F PNEUMOC VAC/ADMIN/RCVD: CPT

## 2018-09-28 PROCEDURE — G0439 PPPS, SUBSEQ VISIT: HCPCS | Performed by: FAMILY MEDICINE

## 2018-09-28 RX ORDER — CYANOCOBALAMIN (VITAMIN B-12) 5000 MCG
TABLET,DISINTEGRATING ORAL
COMMUNITY
End: 2018-09-28 | Stop reason: ALTCHOICE

## 2018-09-28 RX ORDER — LANOLIN ALCOHOL/MO/W.PET/CERES
CREAM (GRAM) TOPICAL DAILY
COMMUNITY

## 2018-09-28 NOTE — ASSESSMENT & PLAN NOTE
Recommended to follow a low-cholesterol, low-fat diet, regular exercise  Continue fish oil, red yeast rice  Check CMP, lipid panel

## 2018-09-28 NOTE — PROGRESS NOTES
Chief Complaint   Patient presents with   [facility] Wellness Visit     Annual wellness visit   Follow-up     6 month follow up  Health Maintenance   Topic Date Due    Medicare Annual Wellness Visit (AWV)  1950    DTaP,Tdap,and Td Vaccines (1 - Tdap) 11/05/1971    Pneumococcal PPSV23/PCV13 65+ Years / High and Highest Risk (2 of 2 - PPSV23) 08/12/2016    Urinary Incontinence Screening  02/16/2018    Fall Risk  09/06/2018    Depression Screening PHQ  09/06/2018    INFLUENZA VACCINE  03/23/2019 (Originally 9/1/2018)    MAMMOGRAM  06/29/2019    CRC Screening: Colonoscopy  08/13/2022       Assessment/Plan:    Hypertension  BP is stable  Continue Losartan/HCTZ 100/12 5 mg daily  Hypothyroidism  Continue replacement with Levothyroxine  Check TSH level  Hyperlipidemia  Recommended to follow a low-cholesterol, low-fat diet, regular exercise  Continue fish oil, red yeast rice  Check CMP, lipid panel  Impaired fasting glucose  Follow a low carb diet, avoid concentrated sweets  Check Hb A1C       Mild intermittent asthma without complication  Symptoms are stable  Use ProAir HFA inhaler PRN  Right groin pain  Patient c/o persistent pain in right groin area with walking, certain movements  No evidence of right inguinal hernia  Will check x-ray of the right hip and pelvis to r/o bone abnormality  HM: Fluzone high-dose administered today  Recommended Tdap, Shingrix  vaccination  Patient will check with insurance regarding coverage  Schedule screening mammogram, colonoscopy, DEXA scan  Schedule follow-up office visit in 6 months  Diagnoses and all orders for this visit:    Essential hypertension  -     CBC and differential; Future  -     Comprehensive metabolic panel; Future    Acquired hypothyroidism  -     TSH, 3rd generation with Free T4 reflex; Future    Mixed hyperlipidemia  -     Comprehensive metabolic panel; Future  -     Lipid panel;  Future    Impaired fasting glucose  -     Hemoglobin A1C; Future    Mild intermittent asthma without complication    Right groin pain  -     XR hip/pelv 2-3 vws right if performed; Future    Medicare annual wellness visit, subsequent    Other orders  -     Discontinue: Cyanocobalamin (VITAMIN B-12) 5000 MCG TBDP; Take by mouth  -     cyanocobalamin (VITAMIN B-12) 1,000 mcg tablet; Take by mouth daily          Subjective:      Patient ID: Justine Heart is a 79 y o  female  HPI     Patient presents for 6 month follow-up of chronic medical conditions  PMHx: HTN, IFG, Hypothyroidism, Hyperlipidemia, GERD, AR, Asthma  Reviewed current medications,  last blood test results from May 2018  TSH 1 02  HTN - patient takes Losartan/HCTZ 100/12 5 mg daily  Denies chest pain, shortness of breath, dizziness  Patient had stress echo done in May 2016 which was negative for stress-induced cardiac ischemia  Hyperlipidemia -patient takes Red yeast rice, fish oil  She tries to follow a low cholesterol diet  Asthma -symptoms are stable  No recent asthma exacerbations  Patient does not use Advair 250/50 now  She did not have to use rescue inhaler for the last few months  Hypothyroidism -currently taking Levothyroxine 112 mcg one tablet daily Monday through Friday and 1/2  tablet on Saturday and Sunday  Weight has been stable  Denies fatigue, constipation, hair loss  Patient c/o  persistent pain in right groin area increasing with certain movements and walking  She denies pain in right hip or low back pain  No history of falls  Patient did not schedule DEXA scan as recommended at the last visit  Last mammogram done in June 2017  Family history is positive for colon CA  Colonoscopy done in August 2012  Dr Johnathan Blair recommended to repeat colonoscopy in 5 years  Prevnar 13 done in April 2016      The following portions of the patient's history were reviewed and updated as appropriate: allergies, past family history, past medical history, past social history, past surgical history and problem list     Review of Systems   Constitutional: Negative for activity change, appetite change, chills, fatigue and fever  HENT: Negative for congestion, ear pain, hearing loss, postnasal drip, sinus pressure, sore throat, tinnitus and trouble swallowing  Eyes: Negative for pain, discharge, redness, itching and visual disturbance  Respiratory: Negative for cough, chest tightness, shortness of breath and wheezing  Cardiovascular: Negative for chest pain, palpitations and leg swelling  Gastrointestinal: Negative for abdominal pain, blood in stool, constipation, diarrhea, nausea and vomiting  No heartburn   Genitourinary: Negative for difficulty urinating, dysuria, flank pain, frequency, hematuria and pelvic pain  Musculoskeletal: Negative for arthralgias, back pain, gait problem, joint swelling and myalgias  Skin: Negative for rash and wound  Neurological: Negative for dizziness, syncope, numbness and headaches  Hematological: Negative  Psychiatric/Behavioral: Negative  Objective:      /74 (BP Location: Left arm, Patient Position: Sitting, Cuff Size: Standard)   Pulse 64   Temp 97 6 °F (36 4 °C) (Tympanic)   Resp 16   Ht 5' 6 5" (1 689 m)   Wt 89 4 kg (197 lb)   BMI 31 32 kg/m²          Physical Exam   Constitutional: She appears well-developed and well-nourished  Mildly obese   HENT:   Head: Normocephalic and atraumatic  Right Ear: External ear normal    Left Ear: External ear normal    Mouth/Throat: Oropharynx is clear and moist    Eyes: Pupils are equal, round, and reactive to light  Conjunctivae are normal    Neck: Normal range of motion  Neck supple  No JVD present  No thyromegaly present  Cardiovascular: Normal rate, regular rhythm and normal heart sounds  No murmur heard    No carotid bruits BL  No BL LE edema   Pulmonary/Chest: Effort normal and breath sounds normal  She has no wheezes  She has no rales  Abdominal: Soft  Bowel sounds are normal  There is no tenderness  No hernia in R inguinal area   Musculoskeletal: Normal range of motion  She exhibits no edema, tenderness or deformity  Skin: Skin is warm and dry  No rash noted  Psychiatric: She has a normal mood and affect  Nursing note and vitals reviewed

## 2018-09-28 NOTE — ASSESSMENT & PLAN NOTE
Patient c/o persistent pain in right groin area with walking, certain movements  No evidence of right inguinal hernia  Will check x-ray of the right hip and pelvis to r/o bone abnormality

## 2018-09-28 NOTE — PROGRESS NOTES
Chief Complaint   Patient presents with   [facility] Wellness Visit     Annual wellness visit   Follow-up     6 month follow up  Assessment and Plan:    Problem List Items Addressed This Visit        Endocrine    Hypothyroidism     Continue replacement with Levothyroxine  Check TSH level  Relevant Orders    TSH, 3rd generation with Free T4 reflex    Impaired fasting glucose     Follow a low carb diet, avoid concentrated sweets  Check Hb A1C            Relevant Orders    Hemoglobin A1C       Respiratory    Mild intermittent asthma without complication     Symptoms are stable  Use ProAir HFA inhaler PRN  Cardiovascular and Mediastinum    Hypertension - Primary     BP is stable  Continue Losartan/HCTZ 100/12 5 mg daily  Relevant Orders    CBC and differential    Comprehensive metabolic panel       Other    Hyperlipidemia     Recommended to follow a low-cholesterol, low-fat diet, regular exercise  Continue fish oil, red yeast rice  Check CMP, lipid panel  Relevant Orders    Comprehensive metabolic panel    Lipid panel    Right groin pain     Patient c/o persistent pain in right groin area with walking, certain movements  No evidence of right inguinal hernia  Will check x-ray of the right hip and pelvis to r/o bone abnormality            Relevant Orders    XR hip/pelv 2-3 vws right if performed    Medicare annual wellness visit, subsequent      Other Visit Diagnoses     Need for influenza vaccination        Relevant Orders    influenza vaccine, [id number], high-dose, PF 0 5 mL, for patients 65 yr+ (FLUZONE HIGH-DOSE) (Completed)        Health Maintenance Due   Topic Date Due    Medicare Annual Wellness Visit (AWV)  1950    DTaP,Tdap,and Td Vaccines (1 - Tdap) 11/05/1971    Pneumococcal PPSV23/PCV13 65+ Years / High and Highest Risk (2 of 2 - PPSV23) 08/12/2016    Urinary Incontinence Screening  02/16/2018    Fall Risk  09/06/2018    Depression Screening PHQ 09/06/2018         HPI:  Vamshi Lima is a 79 y o  female here for her Subsequent Wellness Visit      Patient Active Problem List   Diagnosis    Allergic rhinitis    Diverticulosis    Esophageal reflux    Hyperlipidemia    Hypertension    Hypothyroidism    Impaired fasting glucose    Mild intermittent asthma without complication    Seborrheic keratosis    Right groin pain    Medicare annual wellness visit, subsequent     Past Medical History:   Diagnosis Date    Abnormal EKG     last assessed 4/29/16    Chest skin lesion     last assessed 5/6/16    Depression with anxiety     last assessed 7/23/12    Hearing loss     last assessed 7/23/12     Past Surgical History:   Procedure Laterality Date    COLONOSCOPY      fiberoptic 2012    CYSTOSCOPY      HYSTERECTOMY      LUMBAR LAMINECTOMY       Family History   Problem Relation Age of Onset    Coronary artery disease Mother     Hypertension Mother     Irritable bowel syndrome Mother     Diabetes Father     Lung cancer Father     Hypertension Father     Other Father         elevated cholesterol    Colon cancer Brother     Colon cancer Family     Diabetes Family      History   Smoking Status    Former Smoker   Smokeless Tobacco    Never Used     History   Alcohol Use    Yes     Comment: 3 glasses wine/week      History   Drug use: Unknown       Current Outpatient Prescriptions   Medication Sig Dispense Refill    albuterol (PROAIR HFA) 90 mcg/act inhaler Inhale 2 puffs every 4 (four) hours as needed      ascorbic acid (VITAMIN C) 500 mg tablet Take by mouth      aspirin (ECOTRIN LOW STRENGTH) 81 mg EC tablet Take 1 tablet by mouth daily      Calcium Carbonate-Vitamin D (CALCIUM 600+D) 600-400 MG-UNIT per tablet Take 1 tablet by mouth daily      cetirizine (ZYRTEC ALLERGY) 10 mg tablet Take 1 tablet by mouth daily      cyanocobalamin (VITAMIN B-12) 1,000 mcg tablet Take by mouth daily      fluticasone-salmeterol (ADVAIR DISKUS) 250-50 mcg/dose inhaler Inhale      levothyroxine 112 mcg tablet TAKE 1 TABLET DAILY 90 tablet 3    losartan-hydrochlorothiazide (HYZAAR) 100-12 5 MG per tablet Take 1 tablet by mouth daily      Omega-3 Fatty Acids (FISH OIL) 1200 MG CAPS Take 1 capsule by mouth 2 (two) times a day      omeprazole (PRILOSEC OTC) 20 MG tablet Take 1 tablet by mouth daily as needed      Red Yeast Rice 600 MG CAPS Take 1 capsule by mouth 2 (two) times a day       No current facility-administered medications for this visit  No Known Allergies  Immunization History   Administered Date(s) Administered    Influenza TIV (IM) 11/29/2013, 10/10/2014    Influenza, high dose seasonal 0 5 mL 09/28/2018    Pneumococcal Conjugate 13-Valent 04/29/2016    Pneumococcal Polysaccharide PPV23 08/12/2011       Patient Care Team:  Susan Cullen MD as PCP - General    Medicare Screening Tests and Risk Assessments:  Jeannette Friend is here for her Subsequent Wellness visit  Last Medicare Wellness visit information reviewed, patient interviewed and updates made to the record today  Health Risk Assessment:  Patient rates overall health as very good  Patient feels that their physical health rating is Same  Eyesight was rated as Same  Hearing was rated as Same  Patient feels that their emotional and mental health rating is Same  Pain experienced by patient in the last 7 days has been A lot  Patient's pain rating has been 5/10  Emotional/Mental Health:  Patient has been feeling nervous/anxious  PHQ-9 Depression Screening:    Frequency of the following problems over the past two weeks:      1  Little interest or pleasure in doing things: 0 - not at all      2  Feeling down, depressed, or hopeless: 0 - not at all  PHQ-2 Score: 0          Broken Bones/Falls:     Fall Risk Assessment:    In the past year, patient has experienced: No history of falling in past year          Bladder/Bowel:  Patient has not leaked urine accidently in the last six months  Patient reports no loss of bowel control  Immunizations:  Patient has not had a flu vaccination within the last year  Patient has received a pneumonia shot  Patient has not received a shingles shot  Patient has not received tetanus/diphtheria shot  Home Safety:  Patient currently reports that there are no safety hazards present in home, working smoke alarms, no working carbon monoxide detectors  Preventative Screenings:   Breast cancer screening performed, cholesterol screen completed, glaucoma eye exam completed,     Nutrition:  Current diet: Regular and Limited junk food with servings of the following:    Medications:  Patient is currently taking over-the-counter supplements  Patient is able to manage medications  Lifestyle Choices:  Patient reports no tobacco use  Patient has smoked or used tobacco in the past   Patient reports alcohol use  Alcohol use per week: 3  Patient drives a vehicle  Patient wears seat belt  Activities of Daily Living:  Can get out of bed by his or her self, able to dress self, able to make own meals, able to do own shopping, able to bathe self, can do own laundry/housekeeping, can manage own money, pay bills and track expenses    Previous Hospitalizations:  No hospitalization or ED visit in past 12 months        Advanced Directives:  Patient has decided on a power of   Patient has spoken to designated power of   Patient has completed advanced directive  Social Support: Contact Information  Health Contact Name: Daksha Middleton Address: [address] [place] [address]: [facility] Zip Code: [address]  Telephone (with area code): [phone]    Health Contact Name: Mendez Blum Address: [address] Zip Code: [address]  Telephone (with area code): [phone]    Preventative Screening/Counseling:      Cardiovascular:      General: Risks and Benefits Discussed and Screening Current      Counseling: Healthy Diet, Healthy Weight, Improve Cholesterol, Improve Blood Pressure and Improve Exercise Tolerance          Diabetes:      General: Risks and Benefits Discussed and Screening Current      Counseling: Healthy Diet, Healthy Weight and Improve Physical Activity          Colorectal Cancer:      General: Risks and Benefits Discussed and Screening Current      Counseling: high fiber diet          Breast Cancer:      General: Risks and Benefits Discussed and Screening Current          Cervical Cancer:      General: Risks and Benefits Discussed and Screening Not Indicated          Osteoporosis:      General: Risks and Benefits Discussed      Counseling: Calcium and Vitamin D Intake and Regular Weightbearing Exercise      Due for studies: DXA Axial          AAA:      General: Risks and Benefits Discussed and Screening Not Indicated          Glaucoma:      General: Risks and Benefits Discussed and Screening Current          HIV:      General: Screening Not Indicated          Hepatitis C:      General: Risks and Benefits Discussed        Advanced Directives:   Patient has living will for healthcare, has durable POA for healthcare, Information on ACP and/or AD provided  5 wishes given  End of life assessment reviewed with patient  Immunizations:      Influenza: Risks & Benefits Discussed and Influenza Due Today      Pneumococcal: Risks & Benefits Discussed and Lifetime Vaccine Completed      Shingrix: Risks & Benefits Discussed      Hepatitis B (Low risk patients): Series Not Indicated      TDAP: Risks & Benefits Discussed      Other Preventative Counseling (Non-Medicare):   Fall Prevention, Increase physical activity, Car/seat belt/driving safety reviewed and Skin self-exam      MMSE: 29/30

## 2018-10-01 DIAGNOSIS — R10.31 RIGHT GROIN PAIN: Primary | ICD-10-CM

## 2018-10-12 ENCOUNTER — HOSPITAL ENCOUNTER (OUTPATIENT)
Dept: RADIOLOGY | Facility: HOSPITAL | Age: 68
Discharge: HOME | End: 2018-10-12
Payer: COMMERCIAL

## 2018-10-12 DIAGNOSIS — R10.31 RIGHT GROIN PAIN: ICD-10-CM

## 2018-10-12 PROCEDURE — 76705 ECHO EXAM OF ABDOMEN: CPT

## 2018-12-17 DIAGNOSIS — I10 ESSENTIAL HYPERTENSION: Primary | ICD-10-CM

## 2018-12-17 RX ORDER — LOSARTAN POTASSIUM AND HYDROCHLOROTHIAZIDE 12.5; 1 MG/1; MG/1
TABLET ORAL
Qty: 90 TABLET | Refills: 3 | Status: SHIPPED | OUTPATIENT
Start: 2018-12-17 | End: 2019-12-18 | Stop reason: SDUPTHER

## 2019-03-25 ENCOUNTER — TELEPHONE (OUTPATIENT)
Dept: FAMILY MEDICINE CLINIC | Facility: CLINIC | Age: 69
End: 2019-03-25

## 2019-03-25 NOTE — TELEPHONE ENCOUNTER
Patient called to say she has an upcoming appointment with Dr Queenie Harp and she doesn't have any bloodwork slips and don't know if she needs to fast or not   Hema Thornton can be reached at [phone]

## 2019-03-27 LAB
ALBUMIN SERPL-MCNC: 4.5 G/DL (ref 3.6–5.1)
ALBUMIN/GLOB SERPL: 1.9 (CALC) (ref 1–2.5)
ALP SERPL-CCNC: 64 U/L (ref 33–130)
ALT SERPL-CCNC: 18 U/L (ref 6–29)
AST SERPL-CCNC: 14 U/L (ref 10–35)
BASOPHILS # BLD AUTO: 89 CELLS/UL (ref 0–200)
BASOPHILS NFR BLD AUTO: 1.2 %
BILIRUB SERPL-MCNC: 0.6 MG/DL (ref 0.2–1.2)
BUN SERPL-MCNC: 14 MG/DL (ref 7–25)
BUN/CREAT SERPL: ABNORMAL (CALC) (ref 6–22)
CALCIUM SERPL-MCNC: 9.5 MG/DL (ref 8.6–10.4)
CHLORIDE SERPL-SCNC: 102 MMOL/L (ref 98–110)
CHOLEST SERPL-MCNC: 204 MG/DL
CHOLEST/HDLC SERPL: 3.5 (CALC)
CO2 SERPL-SCNC: 31 MMOL/L (ref 20–32)
CREAT SERPL-MCNC: 0.77 MG/DL (ref 0.5–0.99)
EOSINOPHIL # BLD AUTO: 252 CELLS/UL (ref 15–500)
EOSINOPHIL NFR BLD AUTO: 3.4 %
ERYTHROCYTE [DISTWIDTH] IN BLOOD BY AUTOMATED COUNT: 13 % (ref 11–15)
GLOBULIN SER CALC-MCNC: 2.4 G/DL (CALC) (ref 1.9–3.7)
GLUCOSE SERPL-MCNC: 105 MG/DL (ref 65–99)
HBA1C MFR BLD: 5.5 % OF TOTAL HGB
HCT VFR BLD AUTO: 41 % (ref 35–45)
HDLC SERPL-MCNC: 59 MG/DL
HGB BLD-MCNC: 13.9 G/DL (ref 11.7–15.5)
LDLC SERPL CALC-MCNC: 120 MG/DL (CALC)
LYMPHOCYTES # BLD AUTO: 1177 CELLS/UL (ref 850–3900)
LYMPHOCYTES NFR BLD AUTO: 15.9 %
MCH RBC QN AUTO: 29.7 PG (ref 27–33)
MCHC RBC AUTO-ENTMCNC: 33.9 G/DL (ref 32–36)
MCV RBC AUTO: 87.6 FL (ref 80–100)
MONOCYTES # BLD AUTO: 466 CELLS/UL (ref 200–950)
MONOCYTES NFR BLD AUTO: 6.3 %
NEUTROPHILS # BLD AUTO: 5417 CELLS/UL (ref 1500–7800)
NEUTROPHILS NFR BLD AUTO: 73.2 %
NONHDLC SERPL-MCNC: 145 MG/DL (CALC)
PLATELET # BLD AUTO: 339 THOUSAND/UL (ref 140–400)
PMV BLD REES-ECKER: 9.8 FL (ref 7.5–12.5)
POTASSIUM SERPL-SCNC: 3.9 MMOL/L (ref 3.5–5.3)
PROT SERPL-MCNC: 6.9 G/DL (ref 6.1–8.1)
RBC # BLD AUTO: 4.68 MILLION/UL (ref 3.8–5.1)
SL AMB EGFR AFRICAN AMERICAN: 92 ML/MIN/1.73M2
SL AMB EGFR NON AFRICAN AMERICAN: 79 ML/MIN/1.73M2
SODIUM SERPL-SCNC: 140 MMOL/L (ref 135–146)
TRIGL SERPL-MCNC: 132 MG/DL
TSH SERPL-ACNC: 3.85 MIU/L (ref 0.4–4.5)
WBC # BLD AUTO: 7.4 THOUSAND/UL (ref 3.8–10.8)

## 2019-03-28 PROBLEM — E66.9 OBESITY (BMI 30.0-34.9): Status: ACTIVE | Noted: 2019-03-28

## 2019-03-29 ENCOUNTER — OFFICE VISIT (OUTPATIENT)
Dept: FAMILY MEDICINE CLINIC | Facility: CLINIC | Age: 69
End: 2019-03-29
Payer: COMMERCIAL

## 2019-03-29 VITALS
SYSTOLIC BLOOD PRESSURE: 132 MMHG | RESPIRATION RATE: 16 BRPM | DIASTOLIC BLOOD PRESSURE: 84 MMHG | WEIGHT: 201 LBS | HEART RATE: 64 BPM | BODY MASS INDEX: 31.55 KG/M2 | TEMPERATURE: 98.1 F | OXYGEN SATURATION: 98 % | HEIGHT: 67 IN

## 2019-03-29 DIAGNOSIS — E66.9 OBESITY (BMI 30.0-34.9): ICD-10-CM

## 2019-03-29 DIAGNOSIS — Z11.59 ENCOUNTER FOR HEPATITIS C VIRUS SCREENING TEST FOR HIGH RISK PATIENT: ICD-10-CM

## 2019-03-29 DIAGNOSIS — E03.9 ACQUIRED HYPOTHYROIDISM: ICD-10-CM

## 2019-03-29 DIAGNOSIS — E78.2 MIXED HYPERLIPIDEMIA: ICD-10-CM

## 2019-03-29 DIAGNOSIS — R73.01 IMPAIRED FASTING GLUCOSE: ICD-10-CM

## 2019-03-29 DIAGNOSIS — Z91.89 ENCOUNTER FOR HEPATITIS C VIRUS SCREENING TEST FOR HIGH RISK PATIENT: ICD-10-CM

## 2019-03-29 DIAGNOSIS — J45.20 MILD INTERMITTENT ASTHMA WITHOUT COMPLICATION: ICD-10-CM

## 2019-03-29 DIAGNOSIS — I10 ESSENTIAL HYPERTENSION: Primary | ICD-10-CM

## 2019-03-29 PROCEDURE — 3075F SYST BP GE 130 - 139MM HG: CPT | Performed by: FAMILY MEDICINE

## 2019-03-29 PROCEDURE — 3008F BODY MASS INDEX DOCD: CPT | Performed by: FAMILY MEDICINE

## 2019-03-29 PROCEDURE — 3725F SCREEN DEPRESSION PERFORMED: CPT | Performed by: FAMILY MEDICINE

## 2019-03-29 PROCEDURE — 1036F TOBACCO NON-USER: CPT | Performed by: FAMILY MEDICINE

## 2019-03-29 PROCEDURE — 1160F RVW MEDS BY RX/DR IN RCRD: CPT | Performed by: FAMILY MEDICINE

## 2019-03-29 PROCEDURE — 99214 OFFICE O/P EST MOD 30 MIN: CPT | Performed by: FAMILY MEDICINE

## 2019-03-29 PROCEDURE — 3079F DIAST BP 80-89 MM HG: CPT | Performed by: FAMILY MEDICINE

## 2019-03-29 RX ORDER — LEVOTHYROXINE SODIUM 112 UG/1
TABLET ORAL
Qty: 90 TABLET | Refills: 3 | Status: SHIPPED | OUTPATIENT
Start: 2019-03-29 | End: 2020-02-24

## 2019-03-29 NOTE — PROGRESS NOTES
Chief Complaint   Patient presents with    Follow-up     6 month follow up     Health Maintenance   Topic Date Due    Hepatitis C Screening  1950    BMI: Followup Plan  11/05/1968    DTaP,Tdap,and Td Vaccines (1 - Tdap) 11/05/1971    Pneumococcal PPSV23/PCV13 65+ Years / High and Highest Risk (2 of 2 - PPSV23) 08/12/2016    MAMMOGRAM  06/29/2019    Fall Risk  09/28/2019    Depression Screening PHQ  09/28/2019    Urinary Incontinence Screening  09/28/2019    Medicare Annual Wellness Visit (AWV)  09/28/2019    BMI: Adult  09/28/2019    CRC Screening: Colonoscopy  08/13/2022    INFLUENZA VACCINE  Completed    HEPATITIS B VACCINES  Aged Out     Assessment/Plan:    Hypertension  Blood pressure remains stable  Continue Losartan/HCTZ 100/12 5 mg daily  Follow a low-sodium diet, lose weight  Hyperlipidemia  Encouraged patient to follow a low-cholesterol, low-fat diet, regular exercise  Continue Fish oil, Red yeast rice  Impaired fasting glucose  Discussed dietary modifications  Hypothyroidism  Continue current dose of Levothyroxine  Will recheck TSH in 6 months  Mild intermittent asthma without complication  Symptoms are stable  No recent asthma exacerbations  Use ProAir HFA inhaler PRN  Obesity (BMI 30 0-34  9)  Encouraged regular exercise, weight reduction  HM: recommended to schedule screening mammogram, DEXA scan, colonoscopy  Discussed Tdap, Shingrix vaccination, screening for Hep C  Patient will check with insurance regarding coverage  Schedule follow-up office visit in 6 months  Check labs prior to next visit  Diagnoses and all orders for this visit:    Essential hypertension  -     Comprehensive metabolic panel; Future    Mixed hyperlipidemia  -     Lipid panel; Future    Impaired fasting glucose  -     Comprehensive metabolic panel; Future    Acquired hypothyroidism  -     TSH, 3rd generation with Free T4 reflex;  Future    Mild intermittent asthma without complication    Obesity (BMI 30 0-34  9)    Encounter for hepatitis C virus screening test for high risk patient  -     Hepatitis C antibody; Future          Subjective:      Patient ID: Ruth Amor is a 76 y o  female  HPI     Patient is 35-year-old female with HTN, IFG, Hypothyroidism, Hyperlipidemia, GERD, AR, Asthma  Patient presents 6 month follow-up office visit  Reviewed current medications, blood work results from March 26, 2019  Hb A1c 5 5, fasting blood sugar 105, creatinine 0 77, potassium 3 9  Ccholesterol 204, HDL 59, , triglycerides 132  TSH 3 85  HTN - blood pressure  remains stable  Patient takes Losartan/E/HCTZ 100/12 5 mg daily  Denies chest pain, shortness of breath, dizziness  Stress echo done in May 2016 was negative for stressed induced cardiac ischemia  Hyperlipidemia -patient takes Red yeast rice and Fish oil  She tries to follow a low cholesterol diet, walks a few times per week  Asthma- no recent asthma exacerbations  Patient does not use Advair 250/50 now  She has not been using any rescue inhalers for the last few months  Hypothyroidism -currently taking  Levothyroxine 112 mcg 1 tablet daily Monday through Friday and 1/2  tablet on Saturday and Sunday  Patient denies fatigue, hair loss  C/o occasional constipation  Patient gained 4 lb since last visit in 9/18  Patient did not schedule screening mammogram and DEXA scan as ordered at the last visit  Family history is positive for colon CA  Colonoscopy done  in 8/12  by colorectal surgeon Dr Mirta Redmond who recommended to repeat colonoscopy in 5 years  Prevnar 13 done in April 2016  Patient denies tobacco use      The following portions of the patient's history were reviewed and updated as appropriate: current medications, past family history, past medical history, past social history, past surgical history and problem list     Review of Systems   Constitutional: Negative for activity change, appetite change, chills, fatigue and fever  HENT: Negative for congestion, ear pain, hearing loss, nosebleeds, sore throat, tinnitus and trouble swallowing  Eyes: Negative for pain, discharge, redness, itching and visual disturbance  Respiratory: Negative for cough, chest tightness, shortness of breath and wheezing  Cardiovascular: Negative for chest pain, palpitations and leg swelling  Gastrointestinal: Positive for constipation (occasionally)  Negative for abdominal pain, blood in stool, diarrhea, nausea and vomiting  Genitourinary: Negative for difficulty urinating, dysuria, flank pain, frequency, hematuria and pelvic pain  Musculoskeletal: Positive for back pain (chronic)  Negative for gait problem, joint swelling, myalgias and neck pain  Skin: Negative for rash and wound  Neurological: Negative for dizziness, syncope and headaches  Hematological: Negative  Psychiatric/Behavioral: Negative  Objective:      /84 (BP Location: Left arm, Patient Position: Sitting, Cuff Size: Adult)   Pulse 64   Temp 98 1 °F (36 7 °C) (Tympanic)   Resp 16   Ht 5' 6 5" (1 689 m)   Wt 91 2 kg (201 lb)   SpO2 98%   BMI 31 96 kg/m²          Physical Exam   Constitutional: She appears well-developed and well-nourished  Obese   HENT:   Head: Normocephalic and atraumatic  Right Ear: External ear normal    Left Ear: External ear normal    Mouth/Throat: Oropharynx is clear and moist    Eyes: Pupils are equal, round, and reactive to light  Conjunctivae are normal    Neck: Normal range of motion  Neck supple  No thyromegaly present  Cardiovascular: Normal rate, regular rhythm and normal heart sounds  No murmur heard  No carotid bruits BL  No BL LE edema   Pulmonary/Chest: Effort normal and breath sounds normal    Abdominal: Soft  Bowel sounds are normal  There is no tenderness  Musculoskeletal: Normal range of motion  She exhibits no edema, tenderness or deformity  Lymphadenopathy:     She has no cervical adenopathy  Skin: Skin is warm and dry  No rash noted  Psychiatric: She has a normal mood and affect  Vitals reviewed  BMI Counseling: Body mass index is 31 96 kg/m²  Discussed the patient's BMI with her  The BMI is above average  BMI counseling and education was provided to the patient  Nutrition recommendations include reducing portion sizes, decreasing overall calorie intake, 3-5 servings of fruits/vegetables daily, reducing fast food intake, consuming healthier snacks, decreasing soda and/or juice intake, moderation in carbohydrate intake, increasing intake of lean protein, reducing intake of saturated fat and trans fat and reducing intake of cholesterol

## 2019-03-29 NOTE — ASSESSMENT & PLAN NOTE
Encouraged patient to follow a low-cholesterol, low-fat diet, regular exercise  Continue Fish oil, Red yeast rice

## 2019-03-29 NOTE — ASSESSMENT & PLAN NOTE
Blood pressure remains stable  Continue Losartan/HCTZ 100/12 5 mg daily  Follow a low-sodium diet, lose weight

## 2019-09-26 LAB
ALBUMIN SERPL-MCNC: 4.4 G/DL (ref 3.6–5.1)
ALBUMIN/GLOB SERPL: 1.6 (CALC) (ref 1–2.5)
ALP SERPL-CCNC: 66 U/L (ref 33–130)
ALT SERPL-CCNC: 18 U/L (ref 6–29)
AST SERPL-CCNC: 16 U/L (ref 10–35)
BILIRUB SERPL-MCNC: 0.8 MG/DL (ref 0.2–1.2)
BUN SERPL-MCNC: 16 MG/DL (ref 7–25)
BUN/CREAT SERPL: ABNORMAL (CALC) (ref 6–22)
CALCIUM SERPL-MCNC: 9.6 MG/DL (ref 8.6–10.4)
CHLORIDE SERPL-SCNC: 99 MMOL/L (ref 98–110)
CHOLEST SERPL-MCNC: 227 MG/DL
CHOLEST/HDLC SERPL: 4 (CALC)
CO2 SERPL-SCNC: 30 MMOL/L (ref 20–32)
CREAT SERPL-MCNC: 0.77 MG/DL (ref 0.5–0.99)
GLOBULIN SER CALC-MCNC: 2.7 G/DL (CALC) (ref 1.9–3.7)
GLUCOSE SERPL-MCNC: 116 MG/DL (ref 65–99)
HCV AB S/CO SERPL IA: 0.01
HCV AB SERPL QL IA: NORMAL
HDLC SERPL-MCNC: 57 MG/DL
LDLC SERPL CALC-MCNC: 136 MG/DL (CALC)
NONHDLC SERPL-MCNC: 170 MG/DL (CALC)
POTASSIUM SERPL-SCNC: 3.6 MMOL/L (ref 3.5–5.3)
PROT SERPL-MCNC: 7.1 G/DL (ref 6.1–8.1)
SL AMB EGFR AFRICAN AMERICAN: 92 ML/MIN/1.73M2
SL AMB EGFR NON AFRICAN AMERICAN: 79 ML/MIN/1.73M2
SODIUM SERPL-SCNC: 138 MMOL/L (ref 135–146)
TRIGL SERPL-MCNC: 195 MG/DL
TSH SERPL-ACNC: 2.56 MIU/L (ref 0.4–4.5)

## 2019-10-01 ENCOUNTER — OFFICE VISIT (OUTPATIENT)
Dept: FAMILY MEDICINE CLINIC | Facility: CLINIC | Age: 69
End: 2019-10-01
Payer: COMMERCIAL

## 2019-10-01 VITALS
TEMPERATURE: 98.1 F | WEIGHT: 206 LBS | RESPIRATION RATE: 16 BRPM | SYSTOLIC BLOOD PRESSURE: 130 MMHG | HEIGHT: 67 IN | DIASTOLIC BLOOD PRESSURE: 84 MMHG | OXYGEN SATURATION: 97 % | BODY MASS INDEX: 32.33 KG/M2 | HEART RATE: 68 BPM

## 2019-10-01 DIAGNOSIS — I10 ESSENTIAL HYPERTENSION: Primary | ICD-10-CM

## 2019-10-01 DIAGNOSIS — E03.9 ACQUIRED HYPOTHYROIDISM: ICD-10-CM

## 2019-10-01 DIAGNOSIS — G62.9 PERIPHERAL POLYNEUROPATHY: ICD-10-CM

## 2019-10-01 DIAGNOSIS — R73.01 IMPAIRED FASTING GLUCOSE: ICD-10-CM

## 2019-10-01 DIAGNOSIS — B37.2 CANDIDAL DERMATITIS: ICD-10-CM

## 2019-10-01 DIAGNOSIS — J45.20 MILD INTERMITTENT ASTHMA WITHOUT COMPLICATION: ICD-10-CM

## 2019-10-01 DIAGNOSIS — E66.9 OBESITY (BMI 30.0-34.9): ICD-10-CM

## 2019-10-01 DIAGNOSIS — E78.2 MIXED HYPERLIPIDEMIA: ICD-10-CM

## 2019-10-01 DIAGNOSIS — Z23 NEED FOR INFLUENZA VACCINATION: ICD-10-CM

## 2019-10-01 PROCEDURE — 3079F DIAST BP 80-89 MM HG: CPT | Performed by: FAMILY MEDICINE

## 2019-10-01 PROCEDURE — 90662 IIV NO PRSV INCREASED AG IM: CPT

## 2019-10-01 PROCEDURE — 3075F SYST BP GE 130 - 139MM HG: CPT | Performed by: FAMILY MEDICINE

## 2019-10-01 PROCEDURE — G0008 ADMIN INFLUENZA VIRUS VAC: HCPCS

## 2019-10-01 PROCEDURE — 99215 OFFICE O/P EST HI 40 MIN: CPT | Performed by: FAMILY MEDICINE

## 2019-10-01 PROCEDURE — 1101F PT FALLS ASSESS-DOCD LE1/YR: CPT | Performed by: FAMILY MEDICINE

## 2019-10-01 RX ORDER — GABAPENTIN 100 MG/1
CAPSULE ORAL
Qty: 60 CAPSULE | Refills: 3 | Status: SHIPPED | OUTPATIENT
Start: 2019-10-01 | End: 2020-01-20 | Stop reason: SDUPTHER

## 2019-10-01 RX ORDER — NYSTATIN 100000 [USP'U]/G
POWDER TOPICAL
Qty: 45 G | Refills: 2 | Status: SHIPPED | OUTPATIENT
Start: 2019-10-01 | End: 2019-10-31 | Stop reason: SDUPTHER

## 2019-10-01 RX ORDER — NYSTATIN 100000 [USP'U]/G
POWDER TOPICAL
Qty: 45 G | Refills: 2 | Status: SHIPPED | OUTPATIENT
Start: 2019-10-01 | End: 2021-10-22 | Stop reason: SDUPTHER

## 2019-10-01 NOTE — ASSESSMENT & PLAN NOTE
Refusing statin therapy  Patient will try to improve her diet, lose weight  Continue Fish oil, Red yeast rice  Will recheck lipid panel 6 months

## 2019-10-01 NOTE — ASSESSMENT & PLAN NOTE
Reviewed treatment options with patient  Will start Gabapentin 100 mg at bedtime and gradually titrate up to 300 mg if no side effects  Recommended to call with update onsymptoms in 2 weeks  Continue Vit B12 1000 mcg daily

## 2019-10-01 NOTE — PROGRESS NOTES
Chief Complaint   Patient presents with    Follow-up     6 month follow up     Health Maintenance   Topic Date Due    DTaP,Tdap,and Td Vaccines (1 - Tdap) 11/05/1971    Pneumococcal Vaccine: 65+ Years (2 of 2 - PPSV23) 04/29/2017    MAMMOGRAM  06/29/2019    Medicare Annual Wellness Visit (AWV)  09/28/2019    Depression Screening PHQ  03/29/2020    BMI: Followup Plan  03/29/2020    Fall Risk  10/01/2020    Urinary Incontinence Screening  10/01/2020    BMI: Adult  10/01/2020    CRC Screening: Colonoscopy  08/13/2022    Hepatitis C Screening  Completed    INFLUENZA VACCINE  Completed    Pneumococcal Vaccine: Pediatrics (0 to 5 Years) and At-Risk Patients (6 to 59 Years)  Aged Out    HEPATITIS B VACCINES  Aged Out     Assessment/Plan:    Hypertension  BP is stable  Continue Losartan/ HCTZ 100/12 5 mg daily  Follow a low-sodium diet, regular exercise  Hypothyroidism  Continue current dose of Levothyroxine  Hyperlipidemia  Refusing statin therapy  Patient will try to improve her diet, lose weight  Continue Fish oil, Red yeast rice  Will recheck lipid panel 6 months  Obesity (BMI 30 0-34  9)  Encouraged to work on weight reduction  Impaired fasting glucose  Discussed dietary modifications with patient  Will recheck CMP, Hemoglobin A1c in 6 months  Mild intermittent asthma without complication  Symptoms are stable  No recent asthma exacerbations  Use ProAir HFA inhaler PRN  Peripheral neuropathy  Reviewed treatment options with patient  Will start Gabapentin 100 mg at bedtime and gradually titrate up to 300 mg if no side effects  Recommended to call with update onsymptoms in 2 weeks  Continue Vit B12 1000 mcg daily  Candidal dermatitis  Patient has erythematous rash under left breast     Discussed skin hygiene with patient  Recommended to use Nystop powder twice daily  Call office if rash persists or spreading      HM: Fluzone high dose administered today  Recommended Tdap and Shingrix vaccination  Patient check with insurance regarding coverage  Schedule screening mammogram and colonoscopy as ordered  I have spent 40 minutes with Patient  today in which greater than 50% of this time was spent in counseling/coordination of care regarding Diagnostic results, Risks and benefits of tx options, Intructions for management, Patient and family education, Importance of tx compliance, Risk factor reductions and Impressions  Schedule 6 month follow-up office visit with labs  Diagnoses and all orders for this visit:    Essential hypertension  -     Comprehensive metabolic panel; Future    Acquired hypothyroidism  -     TSH, 3rd generation with Free T4 reflex; Future    Mixed hyperlipidemia  -     Lipid panel; Future    Obesity (BMI 30 0-34  9)    Impaired fasting glucose  -     Comprehensive metabolic panel; Future  -     Hemoglobin A1C; Future    Mild intermittent asthma without complication    Need for influenza vaccination  -     influenza vaccine, 5415-3693, high-dose, PF 0 5 mL (FLUZONE HIGH-DOSE)    Peripheral polyneuropathy  -     gabapentin (NEURONTIN) 100 mg capsule; Start 1 caps  at bedtime for 3- 4 days, then may increase to 2 caps  if no side effects and gradually titrate up to 3 caps  -     CBC and differential; Future  -     Vitamin B12; Future          Subjective:      Patient ID: Neelam Tomas is a 76 y o  female  HPI     Patient presents for 6 month follow-up office visit  PMHx: HTN, IFG, Obesity, Hypothyroidism, Hyperlipidemia, GERD, AR, Asthma  Reviewed current medications, blood test results from September 25, 2019  TSH 2 56, cholesterol 227, HDL 57, triglycerides 195 ( previously 132 in 3/19), , fasting blood sugar 116, creatinine 0 77, potassium 3 6  Hep C Ab  -non- reactive  HTN - blood pressure remains stable  Patient takes Losartan /HCTZ 100/12 5 mg daily    Denies chest pain, shortness of breath, dizziness  Stress echocardiogram done in May 2016 was negative for stressed induced cardiac ischemia       Hyperlipidemia- patient declined statin therapy  She takes Red yeast rice and Fish oil capsules  Admits to not following a healthy diet for the last few months  Asthma - no recent asthma exacerbations  Patient does not use Advair 250/50 currently  She has not been using any rescue inhalers for the last few months  She started taking Zyrtec 10 mg for seasonal allergies  Hypothyroidism - patient takes Levothyroxine 112 mcg 1 tablet daily on Monday through Friday and 1/2 tablet on Saturday and Sunday  Denies fatigue, hair loss  Constipation improved  Patient c/o numbness in both feet, occasional stabbing pains in great toes  Patient had history of back surgery due to herniated disc 40 years ago  C/o chronic low back pain  No changes in bowels or urination  Patient takes Vit B12 1000 mcg daily  C/o rash under her L breast, skin itching  Patient did not schedule screening mammogram and DEXA scan as recommended at the last visit  Family history is positive for colon CA  Colonoscopy done in August 2012 by colorectal surgeon Dr Grace Pemberton who recommended to repeat colonoscopy in 5 years  Patient denies tobacco use  Prevnar 13 done in April 2016  The following portions of the patient's history were reviewed and updated as appropriate: allergies, past family history, past medical history, past social history, past surgical history and problem list     Review of Systems   Constitutional: Negative for activity change, appetite change, chills, fatigue and fever  HENT: Negative for congestion (mild), ear pain, hearing loss, mouth sores, nosebleeds, sore throat, tinnitus and trouble swallowing  Eyes: Negative for pain, discharge, redness, itching and visual disturbance     Respiratory: Negative for cough, chest tightness, shortness of breath and wheezing  Cardiovascular: Negative for chest pain, palpitations and leg swelling  Gastrointestinal: Positive for constipation (improved )  Negative for abdominal pain, diarrhea, nausea and vomiting  Genitourinary: Negative for difficulty urinating, dysuria, flank pain, frequency, hematuria and pelvic pain  Musculoskeletal: Positive for arthralgias (pain in great toes) and back pain  Negative for gait problem (chronic), joint swelling, myalgias and neck pain  Skin: Positive for rash (under left breast)  Negative for wound  Neurological: Positive for numbness (in feet)  Negative for dizziness, syncope and headaches  Hematological: Negative  Psychiatric/Behavioral: Negative  Objective:      /84 (BP Location: Left arm, Patient Position: Sitting, Cuff Size: Large)   Pulse 68   Temp 98 1 °F (36 7 °C) (Tympanic)   Resp 16   Ht 5' 6 5" (1 689 m)   Wt 93 4 kg (206 lb)   SpO2 97%   BMI 32 75 kg/m²          Physical Exam   Constitutional: She is oriented to person, place, and time  She appears well-developed and well-nourished  Obese   HENT:   Head: Normocephalic and atraumatic  Right Ear: External ear normal    Left Ear: External ear normal    Mouth/Throat: Oropharynx is clear and moist    Eyes: Pupils are equal, round, and reactive to light  Conjunctivae are normal    Neck: Normal range of motion  Neck supple  No JVD present  Cardiovascular: Normal rate, regular rhythm and normal heart sounds  No murmur heard  No Bl LE edema  No carotid bruits BL   Pulmonary/Chest: Effort normal and breath sounds normal    Abdominal: Soft  Bowel sounds are normal  There is no tenderness  Musculoskeletal:   No joints swelling or tenderness   Neurological: She is alert and oriented to person, place, and time  No cranial nerve deficit or sensory deficit  Coordination normal    Skin: Skin is warm and dry  Rash (erythematous rash uder L breast) noted     Psychiatric: She has a normal mood and affect  Nursing note and vitals reviewed

## 2019-10-01 NOTE — ASSESSMENT & PLAN NOTE
BP is stable  Continue Losartan/ HCTZ 100/12 5 mg daily  Follow a low-sodium diet, regular exercise

## 2019-10-02 NOTE — ASSESSMENT & PLAN NOTE
Patient has erythematous rash under left breast     Discussed skin hygiene with patient  Recommended to use Nystop powder twice daily  Call office if rash persists or spreading

## 2019-10-31 ENCOUNTER — OFFICE VISIT (OUTPATIENT)
Dept: FAMILY MEDICINE CLINIC | Facility: CLINIC | Age: 69
End: 2019-10-31
Payer: COMMERCIAL

## 2019-10-31 VITALS
SYSTOLIC BLOOD PRESSURE: 130 MMHG | HEART RATE: 100 BPM | RESPIRATION RATE: 20 BRPM | BODY MASS INDEX: 32.46 KG/M2 | WEIGHT: 206.8 LBS | DIASTOLIC BLOOD PRESSURE: 90 MMHG | HEIGHT: 67 IN | TEMPERATURE: 97.6 F

## 2019-10-31 DIAGNOSIS — J40 BRONCHITIS: Primary | ICD-10-CM

## 2019-10-31 PROCEDURE — 99213 OFFICE O/P EST LOW 20 MIN: CPT | Performed by: FAMILY MEDICINE

## 2019-10-31 PROCEDURE — 3008F BODY MASS INDEX DOCD: CPT | Performed by: FAMILY MEDICINE

## 2019-10-31 RX ORDER — AZITHROMYCIN 250 MG/1
TABLET, FILM COATED ORAL
Qty: 6 TABLET | Refills: 0 | Status: SHIPPED | OUTPATIENT
Start: 2019-10-31 | End: 2019-11-04

## 2019-10-31 NOTE — PROGRESS NOTES
Chief Complaint   Patient presents with    URI     Symptoms runny nose, bloody nose, and productive cough in the mornings for 1 week  Assessment/Plan:    A lot of fluids and rest  Tylenol or motrin for fever or pain  Give zpack  Side effects educated patient  If wheezing, should restart advair and use albuterol Q 4-6 hours as needed  BP elevated in office today  Pt denies symptoms like headache, vision change, SOB or chest pain  Pt states she did not take her BP medication today, advised pt to take hyzaar when she gets home  Call office if symptoms no improving or worse  Diagnoses and all orders for this visit:    Bronchitis  -     azithromycin (ZITHROMAX) 250 mg tablet; Take 2 tabs on day 1, then take 1 tab daily from day 2-day 5  Subjective:      Patient ID: Michael Ross is a 76 y o  female  HPI    Pt is here by herself  C/o congestion, face pressure for 1 week  Now she feels it goes to her chest    Cough with phlegm, denies wheezing or SOB  Denies ear pain or sore throat  This morning she saw a little blood when she blows her nose  It stopped quickly  Denies fever, SOB CP, n/v/abd pain  Tried inhaler twice last week which helped  Hx of asthma  She does not use advair for a long time  No smoking  Had sick contact  Got flu shot this season  Tried OTC Dayquil and nyquil  Tried Afrin several times and she is aware of SE  We cannot get pulse ox reading because she is on nail polish  The following portions of the patient's history were reviewed and updated as appropriate: allergies, current medications, past family history, past medical history, past social history, past surgical history and problem list     Review of Systems   Constitutional: Negative for appetite change, chills and fever  HENT: Positive for congestion, nosebleeds and sinus pain  Negative for ear pain and sore throat  Eyes: Negative for discharge and itching     Respiratory: Positive for cough  Negative for apnea, chest tightness, shortness of breath and wheezing  Cardiovascular: Negative for chest pain, palpitations and leg swelling  Gastrointestinal: Negative for abdominal pain, anal bleeding, constipation, diarrhea, nausea and vomiting  Endocrine: Negative for cold intolerance, heat intolerance and polyuria  Genitourinary: Negative for difficulty urinating and dysuria  Musculoskeletal: Negative for arthralgias, back pain and myalgias  Skin: Negative for rash  Neurological: Negative for dizziness and headaches  Psychiatric/Behavioral: Negative for agitation  Objective:      /90 (BP Location: Left arm, Patient Position: Sitting, Cuff Size: Adult)   Pulse 100   Temp 97 6 °F (36 4 °C) (Tympanic)   Resp 20   Ht 5' 6 5" (1 689 m)   Wt 93 8 kg (206 lb 12 8 oz)   BMI 32 88 kg/m²          Physical Exam   Constitutional: She appears well-developed  No distress  HENT:   Head: Normocephalic  Right Ear: External ear normal    Left Ear: External ear normal    Mouth/Throat: Oropharynx is clear and moist    B/l nasal swollen  No bleeding  Eyes: Pupils are equal, round, and reactive to light  Conjunctivae are normal  Right eye exhibits no discharge  Left eye exhibits no discharge  Neck: Normal range of motion  No thyromegaly present  Cardiovascular: Normal rate, regular rhythm and normal heart sounds  Exam reveals no gallop and no friction rub  No murmur heard  Pulmonary/Chest: Effort normal and breath sounds normal  No respiratory distress  She has no wheezes  She has no rales  She exhibits no tenderness  Abdominal: Soft  Bowel sounds are normal    Musculoskeletal: Normal range of motion  She exhibits no edema  Lymphadenopathy:     She has no cervical adenopathy  Neurological: She is alert  Psychiatric: She has a normal mood and affect

## 2019-11-25 ENCOUNTER — OFFICE VISIT (OUTPATIENT)
Dept: FAMILY MEDICINE CLINIC | Facility: CLINIC | Age: 69
End: 2019-11-25
Payer: COMMERCIAL

## 2019-11-25 VITALS
DIASTOLIC BLOOD PRESSURE: 72 MMHG | BODY MASS INDEX: 32.65 KG/M2 | SYSTOLIC BLOOD PRESSURE: 126 MMHG | TEMPERATURE: 99.1 F | RESPIRATION RATE: 20 BRPM | HEIGHT: 67 IN | OXYGEN SATURATION: 97 % | WEIGHT: 208 LBS | HEART RATE: 76 BPM

## 2019-11-25 DIAGNOSIS — J45.20 MILD INTERMITTENT ASTHMA WITHOUT COMPLICATION: ICD-10-CM

## 2019-11-25 DIAGNOSIS — J20.8 ACUTE BRONCHITIS DUE TO OTHER SPECIFIED ORGANISMS: Primary | ICD-10-CM

## 2019-11-25 DIAGNOSIS — I10 ESSENTIAL HYPERTENSION: ICD-10-CM

## 2019-11-25 PROCEDURE — 1036F TOBACCO NON-USER: CPT | Performed by: FAMILY MEDICINE

## 2019-11-25 PROCEDURE — 1160F RVW MEDS BY RX/DR IN RCRD: CPT | Performed by: FAMILY MEDICINE

## 2019-11-25 PROCEDURE — 99214 OFFICE O/P EST MOD 30 MIN: CPT | Performed by: FAMILY MEDICINE

## 2019-11-25 RX ORDER — ALBUTEROL SULFATE 90 UG/1
2 AEROSOL, METERED RESPIRATORY (INHALATION) EVERY 4 HOURS PRN
Qty: 1 INHALER | Refills: 3 | Status: SHIPPED | OUTPATIENT
Start: 2019-11-25 | End: 2021-04-20 | Stop reason: ALTCHOICE

## 2019-11-25 RX ORDER — LEVOFLOXACIN 500 MG/1
500 TABLET, FILM COATED ORAL EVERY 24 HOURS
Qty: 7 TABLET | Refills: 0 | Status: SHIPPED | OUTPATIENT
Start: 2019-11-25 | End: 2019-12-02

## 2019-11-25 RX ORDER — PROMETHAZINE HYDROCHLORIDE AND CODEINE PHOSPHATE 6.25; 1 MG/5ML; MG/5ML
5 SYRUP ORAL EVERY 6 HOURS PRN
Qty: 240 ML | Refills: 0 | Status: SHIPPED | OUTPATIENT
Start: 2019-11-25 | End: 2020-10-01 | Stop reason: ALTCHOICE

## 2019-11-25 NOTE — ASSESSMENT & PLAN NOTE
Prescription refilled for Advair 250/50 - use 1 puff twice daily  Use ProAir HFA inhaler 2 puffs every 4 - 6 hours as needed for chest tightness or wheezing

## 2019-11-25 NOTE — PROGRESS NOTES
Chief Complaint   Patient presents with   Carolyne Justice Like Symptoms     Health Maintenance   Topic Date Due    DTaP,Tdap,and Td Vaccines (1 - Tdap) 11/05/1961    Pneumococcal Vaccine: 65+ Years (2 of 2 - PPSV23) 04/29/2017    MAMMOGRAM  06/29/2019    Medicare Annual Wellness Visit (AWV)  09/28/2019    Depression Screening PHQ  03/29/2020    BMI: Followup Plan  03/29/2020    Fall Risk  10/01/2020    Urinary Incontinence Screening  10/01/2020    BMI: Adult  10/31/2020    CRC Screening: Colonoscopy  08/13/2022    Hepatitis C Screening  Completed    Influenza Vaccine  Completed    Pneumococcal Vaccine: Pediatrics (0 to 5 Years) and At-Risk Patients (6 to 59 Years)  Aged Out    HIB Vaccine  Aged Out    Hepatitis B Vaccine  Aged Out    IPV Vaccine  Aged Out    Hepatitis A Vaccine  Aged Out    Meningococcal ACWY Vaccine  Aged Out    HPV Vaccine  Aged Out     Assessment/Plan:    Acute bronchitis due to other specified organisms  Start Levofloxacin 500 mg 1 tablet daily with food for 1 week, Promethazine with Codeine 1 tsp  every 6 hours as needed for cough  Recommended to increase fluid intake  Call office if symptoms persist or worsen  Mild intermittent asthma without complication  Prescription refilled for Advair 250/50 - use 1 puff twice daily  Use ProAir HFA inhaler 2 puffs every 4 - 6 hours as needed for chest tightness or wheezing  Hypertension  BP is adequately controlled  Continue Losartan /HCTZ 100/12 5 mg daily  Follow a low-sodium diet, regular exercise  Diagnoses and all orders for this visit:    Acute bronchitis due to other specified organisms  -     levofloxacin (LEVAQUIN) 500 mg tablet; Take 1 tablet (500 mg total) by mouth every 24 hours for 7 days  -     promethazine-codeine (PHENERGAN WITH CODEINE) 6 25-10 mg/5 mL syrup;  Take 5 mL by mouth every 6 (six) hours as needed for cough    Mild intermittent asthma without complication  -     fluticasone-salmeterol (ADVAIR DISKUS) 250-50 mcg/dose inhaler; Inhale 1 puff 2 (two) times a day  -     albuterol (PROAIR HFA) 90 mcg/act inhaler; Inhale 2 puffs every 4 (four) hours as needed (for chest tightness or wheezing)    Essential hypertension          Subjective:      Patient ID: Cleo Juarez is a 71 y o  female  HPI     Patient presents to the office c/o sinus congestion, chest congestion, deep cough, some chest tightness, wheezing for the last 4- 5 days  Denies fever, chills  Patient has mild intermittent asthma  Requesting refill for Advair  250/50 dskus, ProAir HFA inhaler  Patient is leaving for vacation in a few days  She has history of bronchitis in the past       Denies tobacco use  HTN - blood pressure is stable  Patient takes  Losartan/ HCTZ 100/12 5 mg daily  Denies chest pain, dizziness  The following portions of the patient's history were reviewed and updated as appropriate: current medications, past family history, past medical history, past social history, past surgical history and problem list     Review of Systems   Constitutional: Positive for fatigue (mild)  Negative for activity change, appetite change, chills and fever  HENT: Positive for congestion  Negative for ear pain, hearing loss, mouth sores, nosebleeds, sore throat and trouble swallowing  Eyes: Negative for pain, discharge, redness, itching and visual disturbance  Respiratory: Positive for cough, chest tightness and wheezing  Negative for shortness of breath  Cardiovascular: Negative for chest pain, palpitations and leg swelling  Gastrointestinal: Negative for abdominal pain, diarrhea, nausea and vomiting  Genitourinary: Negative for difficulty urinating, dysuria, flank pain, frequency and hematuria  Musculoskeletal: Negative for arthralgias, back pain and neck pain  Skin: Negative for rash  Neurological: Negative for dizziness and headaches  Hematological: Negative      Psychiatric/Behavioral: Negative  Objective:      /72 (BP Location: Left arm, Patient Position: Sitting, Cuff Size: Adult)   Pulse 76   Temp 99 1 °F (37 3 °C) (Tympanic)   Resp 20   Ht 5' 6 5" (1 689 m)   Wt 94 3 kg (208 lb)   SpO2 97%   BMI 33 07 kg/m²          Physical Exam   Constitutional: She appears well-developed and well-nourished  Obese   HENT:   Head: Normocephalic and atraumatic  Right Ear: External ear normal    Left Ear: External ear normal    Mouth/Throat: Oropharynx is clear and moist    Nasal mucosa is erythematous   Eyes: Pupils are equal, round, and reactive to light  Conjunctivae are normal    Cardiovascular: Normal rate, regular rhythm and normal heart sounds  No murmur heard  No BL LE edema   Pulmonary/Chest: Effort normal    Scattered wheezes  Coarse breath sounds BL  Abdominal: Soft  Bowel sounds are normal  There is no tenderness  Musculoskeletal: Normal range of motion  She exhibits no edema, tenderness or deformity  Skin: Skin is warm and dry  No rash noted  Nursing note and vitals reviewed

## 2019-11-25 NOTE — ASSESSMENT & PLAN NOTE
BP is adequately controlled  Continue Losartan /HCTZ 100/12 5 mg daily  Follow a low-sodium diet, regular exercise

## 2019-11-25 NOTE — ASSESSMENT & PLAN NOTE
Start Levofloxacin 500 mg 1 tablet daily with food for 1 week, Promethazine with Codeine 1 tsp  every 6 hours as needed for cough  Recommended to increase fluid intake  Call office if symptoms persist or worsen

## 2019-12-18 DIAGNOSIS — I10 ESSENTIAL HYPERTENSION: ICD-10-CM

## 2019-12-18 RX ORDER — LOSARTAN POTASSIUM AND HYDROCHLOROTHIAZIDE 12.5; 1 MG/1; MG/1
TABLET ORAL
Qty: 90 TABLET | Refills: 4 | Status: SHIPPED | OUTPATIENT
Start: 2019-12-18 | End: 2021-02-02

## 2020-01-20 DIAGNOSIS — G62.9 PERIPHERAL POLYNEUROPATHY: ICD-10-CM

## 2020-01-20 RX ORDER — GABAPENTIN 100 MG/1
300 CAPSULE ORAL
Qty: 270 CAPSULE | Refills: 3 | Status: SHIPPED | OUTPATIENT
Start: 2020-01-20 | End: 2020-10-06 | Stop reason: SDUPTHER

## 2020-02-24 DIAGNOSIS — E03.9 ACQUIRED HYPOTHYROIDISM: ICD-10-CM

## 2020-02-24 RX ORDER — LEVOTHYROXINE SODIUM 112 UG/1
TABLET ORAL
Qty: 90 TABLET | Refills: 4 | Status: SHIPPED | OUTPATIENT
Start: 2020-02-24 | End: 2021-08-09

## 2020-09-25 LAB
ALBUMIN SERPL-MCNC: 4.3 G/DL (ref 3.6–5.1)
ALBUMIN/GLOB SERPL: 1.8 (CALC) (ref 1–2.5)
ALP SERPL-CCNC: 65 U/L (ref 37–153)
ALT SERPL-CCNC: 18 U/L (ref 6–29)
AST SERPL-CCNC: 14 U/L (ref 10–35)
BASOPHILS # BLD AUTO: 56 CELLS/UL (ref 0–200)
BASOPHILS NFR BLD AUTO: 0.6 %
BILIRUB SERPL-MCNC: 0.7 MG/DL (ref 0.2–1.2)
BUN SERPL-MCNC: 13 MG/DL (ref 7–25)
BUN/CREAT SERPL: ABNORMAL (CALC) (ref 6–22)
CALCIUM SERPL-MCNC: 9.8 MG/DL (ref 8.6–10.4)
CHLORIDE SERPL-SCNC: 102 MMOL/L (ref 98–110)
CHOLEST SERPL-MCNC: 214 MG/DL
CHOLEST/HDLC SERPL: 3.7 (CALC)
CO2 SERPL-SCNC: 33 MMOL/L (ref 20–32)
CREAT SERPL-MCNC: 0.87 MG/DL (ref 0.5–0.99)
EOSINOPHIL # BLD AUTO: 197 CELLS/UL (ref 15–500)
EOSINOPHIL NFR BLD AUTO: 2.1 %
ERYTHROCYTE [DISTWIDTH] IN BLOOD BY AUTOMATED COUNT: 13 % (ref 11–15)
GLOBULIN SER CALC-MCNC: 2.4 G/DL (CALC) (ref 1.9–3.7)
GLUCOSE SERPL-MCNC: 120 MG/DL (ref 65–99)
HBA1C MFR BLD: 5.5 % OF TOTAL HGB
HCT VFR BLD AUTO: 41.8 % (ref 35–45)
HDLC SERPL-MCNC: 58 MG/DL
HGB BLD-MCNC: 13.8 G/DL (ref 11.7–15.5)
LDLC SERPL CALC-MCNC: 127 MG/DL (CALC)
LYMPHOCYTES # BLD AUTO: 1109 CELLS/UL (ref 850–3900)
LYMPHOCYTES NFR BLD AUTO: 11.8 %
MCH RBC QN AUTO: 28.8 PG (ref 27–33)
MCHC RBC AUTO-ENTMCNC: 33 G/DL (ref 32–36)
MCV RBC AUTO: 87.3 FL (ref 80–100)
MONOCYTES # BLD AUTO: 526 CELLS/UL (ref 200–950)
MONOCYTES NFR BLD AUTO: 5.6 %
NEUTROPHILS # BLD AUTO: 7511 CELLS/UL (ref 1500–7800)
NEUTROPHILS NFR BLD AUTO: 79.9 %
NONHDLC SERPL-MCNC: 156 MG/DL (CALC)
PLATELET # BLD AUTO: 360 THOUSAND/UL (ref 140–400)
PMV BLD REES-ECKER: 10 FL (ref 7.5–12.5)
POTASSIUM SERPL-SCNC: 4.2 MMOL/L (ref 3.5–5.3)
PROT SERPL-MCNC: 6.7 G/DL (ref 6.1–8.1)
RBC # BLD AUTO: 4.79 MILLION/UL (ref 3.8–5.1)
SL AMB EGFR AFRICAN AMERICAN: 79 ML/MIN/1.73M2
SL AMB EGFR NON AFRICAN AMERICAN: 68 ML/MIN/1.73M2
SODIUM SERPL-SCNC: 141 MMOL/L (ref 135–146)
TRIGL SERPL-MCNC: 175 MG/DL
TSH SERPL-ACNC: 2.69 MIU/L (ref 0.4–4.5)
WBC # BLD AUTO: 9.4 THOUSAND/UL (ref 3.8–10.8)

## 2020-09-30 PROBLEM — J20.8 ACUTE BRONCHITIS DUE TO OTHER SPECIFIED ORGANISMS: Status: RESOLVED | Noted: 2019-11-25 | Resolved: 2020-09-30

## 2020-10-01 ENCOUNTER — OFFICE VISIT (OUTPATIENT)
Dept: FAMILY MEDICINE CLINIC | Facility: CLINIC | Age: 70
End: 2020-10-01
Payer: COMMERCIAL

## 2020-10-01 VITALS
HEIGHT: 67 IN | WEIGHT: 201.4 LBS | DIASTOLIC BLOOD PRESSURE: 88 MMHG | SYSTOLIC BLOOD PRESSURE: 134 MMHG | RESPIRATION RATE: 20 BRPM | BODY MASS INDEX: 31.61 KG/M2 | HEART RATE: 64 BPM | TEMPERATURE: 97.5 F | OXYGEN SATURATION: 97 %

## 2020-10-01 DIAGNOSIS — E78.2 MIXED HYPERLIPIDEMIA: ICD-10-CM

## 2020-10-01 DIAGNOSIS — Z23 NEED FOR INFLUENZA VACCINATION: ICD-10-CM

## 2020-10-01 DIAGNOSIS — R73.01 IMPAIRED FASTING GLUCOSE: ICD-10-CM

## 2020-10-01 DIAGNOSIS — I10 ESSENTIAL HYPERTENSION: Primary | ICD-10-CM

## 2020-10-01 DIAGNOSIS — Z12.39 SCREENING FOR MALIGNANT NEOPLASM OF BREAST: ICD-10-CM

## 2020-10-01 DIAGNOSIS — E03.9 ACQUIRED HYPOTHYROIDISM: ICD-10-CM

## 2020-10-01 DIAGNOSIS — Z78.0 MENOPAUSE: ICD-10-CM

## 2020-10-01 DIAGNOSIS — E66.9 OBESITY (BMI 30.0-34.9): ICD-10-CM

## 2020-10-01 DIAGNOSIS — J45.20 MILD INTERMITTENT ASTHMA WITHOUT COMPLICATION: ICD-10-CM

## 2020-10-01 DIAGNOSIS — G62.9 PERIPHERAL POLYNEUROPATHY: ICD-10-CM

## 2020-10-01 DIAGNOSIS — Z13.820 SCREENING FOR OSTEOPOROSIS: ICD-10-CM

## 2020-10-01 DIAGNOSIS — J30.1 SEASONAL ALLERGIC RHINITIS DUE TO POLLEN: ICD-10-CM

## 2020-10-01 PROCEDURE — 1160F RVW MEDS BY RX/DR IN RCRD: CPT | Performed by: FAMILY MEDICINE

## 2020-10-01 PROCEDURE — 3079F DIAST BP 80-89 MM HG: CPT | Performed by: FAMILY MEDICINE

## 2020-10-01 PROCEDURE — G0008 ADMIN INFLUENZA VIRUS VAC: HCPCS

## 2020-10-01 PROCEDURE — 99214 OFFICE O/P EST MOD 30 MIN: CPT | Performed by: FAMILY MEDICINE

## 2020-10-01 PROCEDURE — 1036F TOBACCO NON-USER: CPT | Performed by: FAMILY MEDICINE

## 2020-10-01 PROCEDURE — 90662 IIV NO PRSV INCREASED AG IM: CPT

## 2020-10-01 PROCEDURE — 3075F SYST BP GE 130 - 139MM HG: CPT | Performed by: FAMILY MEDICINE

## 2020-10-06 DIAGNOSIS — G62.9 PERIPHERAL POLYNEUROPATHY: ICD-10-CM

## 2020-10-06 RX ORDER — GABAPENTIN 300 MG/1
300 CAPSULE ORAL 2 TIMES DAILY
Qty: 180 CAPSULE | Refills: 3 | Status: SHIPPED | OUTPATIENT
Start: 2020-10-06 | End: 2021-08-09

## 2020-12-03 ENCOUNTER — OFFICE VISIT (OUTPATIENT)
Dept: FAMILY MEDICINE CLINIC | Facility: CLINIC | Age: 70
End: 2020-12-03
Payer: COMMERCIAL

## 2020-12-03 ENCOUNTER — TRANSCRIBE ORDERS (OUTPATIENT)
Dept: LAB | Facility: CLINIC | Age: 70
End: 2020-12-03

## 2020-12-03 VITALS
TEMPERATURE: 97.5 F | OXYGEN SATURATION: 98 % | WEIGHT: 201 LBS | DIASTOLIC BLOOD PRESSURE: 78 MMHG | BODY MASS INDEX: 31.55 KG/M2 | SYSTOLIC BLOOD PRESSURE: 128 MMHG | RESPIRATION RATE: 14 BRPM | HEART RATE: 78 BPM | HEIGHT: 67 IN

## 2020-12-03 DIAGNOSIS — L82.0 SEBORRHEIC KERATOSIS, INFLAMED: Primary | ICD-10-CM

## 2020-12-03 DIAGNOSIS — Z00.00 MEDICARE ANNUAL WELLNESS VISIT, SUBSEQUENT: ICD-10-CM

## 2020-12-03 PROCEDURE — 88305 TISSUE EXAM BY PATHOLOGIST: CPT | Performed by: PATHOLOGY

## 2020-12-03 PROCEDURE — 1160F RVW MEDS BY RX/DR IN RCRD: CPT | Performed by: FAMILY MEDICINE

## 2020-12-03 PROCEDURE — 1170F FXNL STATUS ASSESSED: CPT | Performed by: FAMILY MEDICINE

## 2020-12-03 PROCEDURE — 99213 OFFICE O/P EST LOW 20 MIN: CPT | Performed by: FAMILY MEDICINE

## 2020-12-03 PROCEDURE — 1125F AMNT PAIN NOTED PAIN PRSNT: CPT | Performed by: FAMILY MEDICINE

## 2020-12-03 PROCEDURE — 3725F SCREEN DEPRESSION PERFORMED: CPT | Performed by: FAMILY MEDICINE

## 2020-12-03 PROCEDURE — 3008F BODY MASS INDEX DOCD: CPT | Performed by: FAMILY MEDICINE

## 2020-12-03 PROCEDURE — 3078F DIAST BP <80 MM HG: CPT | Performed by: FAMILY MEDICINE

## 2020-12-03 PROCEDURE — 1036F TOBACCO NON-USER: CPT | Performed by: FAMILY MEDICINE

## 2020-12-03 PROCEDURE — 3074F SYST BP LT 130 MM HG: CPT | Performed by: FAMILY MEDICINE

## 2020-12-03 PROCEDURE — G0439 PPPS, SUBSEQ VISIT: HCPCS | Performed by: FAMILY MEDICINE

## 2020-12-07 ENCOUNTER — VBI (OUTPATIENT)
Dept: ADMINISTRATIVE | Facility: OTHER | Age: 70
End: 2020-12-07

## 2020-12-08 ENCOUNTER — HOSPITAL ENCOUNTER (OUTPATIENT)
Dept: RADIOLOGY | Age: 70
Discharge: HOME | End: 2020-12-08
Payer: COMMERCIAL

## 2020-12-08 VITALS — WEIGHT: 201 LBS | BODY MASS INDEX: 31.55 KG/M2 | HEIGHT: 67 IN

## 2020-12-08 DIAGNOSIS — Z12.31 ENCOUNTER FOR SCREENING MAMMOGRAM FOR MALIGNANT NEOPLASM OF BREAST: ICD-10-CM

## 2020-12-08 DIAGNOSIS — Z12.39 SCREENING FOR MALIGNANT NEOPLASM OF BREAST: ICD-10-CM

## 2020-12-08 PROCEDURE — 77063 BREAST TOMOSYNTHESIS BI: CPT

## 2020-12-08 PROCEDURE — 77067 SCR MAMMO BI INCL CAD: CPT

## 2021-02-01 DIAGNOSIS — I10 ESSENTIAL HYPERTENSION: ICD-10-CM

## 2021-02-02 RX ORDER — LOSARTAN POTASSIUM AND HYDROCHLOROTHIAZIDE 12.5; 1 MG/1; MG/1
TABLET ORAL
Qty: 90 TABLET | Refills: 3 | Status: SHIPPED | OUTPATIENT
Start: 2021-02-02 | End: 2022-02-18

## 2021-02-03 ENCOUNTER — TRANSCRIBE ORDERS (OUTPATIENT)
Dept: LAB | Facility: CLINIC | Age: 71
End: 2021-02-03

## 2021-02-05 ENCOUNTER — HOSPITAL ENCOUNTER (OUTPATIENT)
Dept: RADIOLOGY | Age: 71
Discharge: HOME | End: 2021-02-05
Payer: COMMERCIAL

## 2021-02-05 DIAGNOSIS — Z13.820 SCREENING FOR OSTEOPOROSIS: ICD-10-CM

## 2021-02-05 DIAGNOSIS — Z78.0 MENOPAUSE: ICD-10-CM

## 2021-02-05 PROCEDURE — 77080 DXA BONE DENSITY AXIAL: CPT

## 2021-03-10 DIAGNOSIS — Z23 ENCOUNTER FOR IMMUNIZATION: ICD-10-CM

## 2021-03-11 ENCOUNTER — IMMUNIZATIONS (OUTPATIENT)
Dept: FAMILY MEDICINE CLINIC | Facility: HOSPITAL | Age: 71
End: 2021-03-11

## 2021-03-11 DIAGNOSIS — Z23 ENCOUNTER FOR IMMUNIZATION: Primary | ICD-10-CM

## 2021-03-11 PROCEDURE — 91300 SARS-COV-2 / COVID-19 MRNA VACCINE (PFIZER-BIONTECH) 30 MCG: CPT

## 2021-03-11 PROCEDURE — 0001A SARS-COV-2 / COVID-19 MRNA VACCINE (PFIZER-BIONTECH) 30 MCG: CPT

## 2021-03-31 LAB
ALBUMIN SERPL-MCNC: 4.5 G/DL (ref 3.6–5.1)
ALBUMIN/GLOB SERPL: 1.8 (CALC) (ref 1–2.5)
ALP SERPL-CCNC: 63 U/L (ref 37–153)
ALT SERPL-CCNC: 22 U/L (ref 6–29)
AST SERPL-CCNC: 16 U/L (ref 10–35)
BILIRUB SERPL-MCNC: 0.5 MG/DL (ref 0.2–1.2)
BUN SERPL-MCNC: 23 MG/DL (ref 7–25)
BUN/CREAT SERPL: ABNORMAL (CALC) (ref 6–22)
CALCIUM SERPL-MCNC: 10.3 MG/DL (ref 8.6–10.4)
CHLORIDE SERPL-SCNC: 102 MMOL/L (ref 98–110)
CHOLEST SERPL-MCNC: 206 MG/DL
CHOLEST/HDLC SERPL: 3.6 (CALC)
CO2 SERPL-SCNC: 31 MMOL/L (ref 20–32)
CREAT SERPL-MCNC: 0.73 MG/DL (ref 0.6–0.93)
GLOBULIN SER CALC-MCNC: 2.5 G/DL (CALC) (ref 1.9–3.7)
GLUCOSE SERPL-MCNC: 119 MG/DL (ref 65–99)
HDLC SERPL-MCNC: 57 MG/DL
LDLC SERPL CALC-MCNC: 124 MG/DL (CALC)
NONHDLC SERPL-MCNC: 149 MG/DL (CALC)
POTASSIUM SERPL-SCNC: 4.6 MMOL/L (ref 3.5–5.3)
PROT SERPL-MCNC: 7 G/DL (ref 6.1–8.1)
SL AMB EGFR AFRICAN AMERICAN: 97 ML/MIN/1.73M2
SL AMB EGFR NON AFRICAN AMERICAN: 83 ML/MIN/1.73M2
SODIUM SERPL-SCNC: 142 MMOL/L (ref 135–146)
TRIGL SERPL-MCNC: 133 MG/DL
TSH SERPL-ACNC: 1.2 MIU/L (ref 0.4–4.5)

## 2021-04-03 ENCOUNTER — IMMUNIZATIONS (OUTPATIENT)
Dept: FAMILY MEDICINE CLINIC | Facility: HOSPITAL | Age: 71
End: 2021-04-03

## 2021-04-03 DIAGNOSIS — Z23 ENCOUNTER FOR IMMUNIZATION: Primary | ICD-10-CM

## 2021-04-03 PROCEDURE — 0002A SARS-COV-2 / COVID-19 MRNA VACCINE (PFIZER-BIONTECH) 30 MCG: CPT

## 2021-04-03 PROCEDURE — 91300 SARS-COV-2 / COVID-19 MRNA VACCINE (PFIZER-BIONTECH) 30 MCG: CPT

## 2021-04-17 PROBLEM — B37.2 CANDIDAL DERMATITIS: Status: RESOLVED | Noted: 2019-10-01 | Resolved: 2021-04-17

## 2021-04-20 ENCOUNTER — OFFICE VISIT (OUTPATIENT)
Dept: FAMILY MEDICINE CLINIC | Facility: CLINIC | Age: 71
End: 2021-04-20
Payer: COMMERCIAL

## 2021-04-20 VITALS
SYSTOLIC BLOOD PRESSURE: 120 MMHG | RESPIRATION RATE: 14 BRPM | BODY MASS INDEX: 30.61 KG/M2 | HEIGHT: 67 IN | HEART RATE: 56 BPM | DIASTOLIC BLOOD PRESSURE: 82 MMHG | OXYGEN SATURATION: 97 % | TEMPERATURE: 97.4 F | WEIGHT: 195 LBS

## 2021-04-20 DIAGNOSIS — G62.9 PERIPHERAL POLYNEUROPATHY: ICD-10-CM

## 2021-04-20 DIAGNOSIS — E66.9 OBESITY (BMI 30.0-34.9): ICD-10-CM

## 2021-04-20 DIAGNOSIS — E03.9 ACQUIRED HYPOTHYROIDISM: ICD-10-CM

## 2021-04-20 DIAGNOSIS — E78.2 MIXED HYPERLIPIDEMIA: ICD-10-CM

## 2021-04-20 DIAGNOSIS — J30.1 SEASONAL ALLERGIC RHINITIS DUE TO POLLEN: ICD-10-CM

## 2021-04-20 DIAGNOSIS — J45.20 MILD INTERMITTENT ASTHMA WITHOUT COMPLICATION: ICD-10-CM

## 2021-04-20 DIAGNOSIS — R42 DIZZINESS: ICD-10-CM

## 2021-04-20 DIAGNOSIS — R73.01 IMPAIRED FASTING GLUCOSE: ICD-10-CM

## 2021-04-20 DIAGNOSIS — M25.551 RIGHT HIP PAIN: ICD-10-CM

## 2021-04-20 DIAGNOSIS — I10 ESSENTIAL HYPERTENSION: Primary | ICD-10-CM

## 2021-04-20 PROCEDURE — 99215 OFFICE O/P EST HI 40 MIN: CPT | Performed by: FAMILY MEDICINE

## 2021-04-20 PROCEDURE — 93000 ELECTROCARDIOGRAM COMPLETE: CPT | Performed by: FAMILY MEDICINE

## 2021-04-20 PROCEDURE — 1036F TOBACCO NON-USER: CPT | Performed by: FAMILY MEDICINE

## 2021-04-20 PROCEDURE — 1160F RVW MEDS BY RX/DR IN RCRD: CPT | Performed by: FAMILY MEDICINE

## 2021-04-20 PROCEDURE — 3074F SYST BP LT 130 MM HG: CPT | Performed by: FAMILY MEDICINE

## 2021-04-20 PROCEDURE — 3079F DIAST BP 80-89 MM HG: CPT | Performed by: FAMILY MEDICINE

## 2021-04-20 RX ORDER — FEXOFENADINE HCL 180 MG/1
180 TABLET ORAL DAILY
COMMUNITY

## 2021-04-20 RX ORDER — MECLIZINE HCL 12.5 MG/1
12.5 TABLET ORAL EVERY 8 HOURS PRN
Qty: 30 TABLET | Refills: 1 | Status: SHIPPED | OUTPATIENT
Start: 2021-04-20

## 2021-04-20 NOTE — ASSESSMENT & PLAN NOTE
Lipid panel improved since September 2020 with dietary modifications  Recommended to follow a low-cholesterol, low-fat diet, continue regular exercise  Recheck lipid panel in 6 months

## 2021-04-20 NOTE — PROGRESS NOTES
Chief Complaint   Patient presents with    Follow-up     6 month follow up     Health Maintenance   Topic Date Due    DTaP,Tdap,and Td Vaccines (1 - Tdap) Never done    Pneumococcal Vaccine: 65+ Years (2 of 2 - PPSV23) 04/29/2017    Fall Risk  12/03/2021    Depression Screening PHQ  12/03/2021    Medicare Annual Wellness Visit (AWV)  12/03/2021    BMI: Followup Plan  12/03/2021    BMI: Adult  04/20/2022    Colorectal Cancer Screening  08/13/2022    MAMMOGRAM  12/08/2022    Hepatitis C Screening  Completed    Influenza Vaccine  Completed    COVID-19 Vaccine  Completed    HIB Vaccine  Aged Out    Hepatitis B Vaccine  Aged Out    IPV Vaccine  Aged Out    Hepatitis A Vaccine  Aged Out    Meningococcal ACWY Vaccine  Aged Out    HPV Vaccine  Aged Out         BMI Counseling: Body mass index is 31 kg/m²  The BMI is above normal  Nutrition recommendations include decreasing portion sizes, encouraging healthy choices of fruits and vegetables, decreasing fast food intake, consuming healthier snacks, limiting drinks that contain sugar, moderation in carbohydrate intake, increasing intake of lean protein, reducing intake of saturated and trans fat and reducing intake of cholesterol  Exercise recommendations include exercising 3-5 times per week  No pharmacotherapy was ordered  Assessment/Plan:    Hypertension  BP today 120/82  Continue Losartan/ HCTZ 100/12 5 mg daily  Recommended to stay well hydrated  Check blood pressure at home and call with blood pressure log in 7-10 days  Dizziness  Symptoms are likely secondary to BPV  Will check EKG to rule out cardiac etiology  EKG done in the office showed normal sinus rhythm, 63 beats per minute  No acute ST- T wave changes  Recommended to stay well hydrated, avoid rapid position changes  Take Meclizine 12 5 mg at bedtime and every 8 hr  PRN during the day      Call office if symptoms persist or worsen, will refer to ENT for further evaluation  Hyperlipidemia  Lipid panel improved since September 2020 with dietary modifications  Recommended to follow a low-cholesterol, low-fat diet, continue regular exercise  Recheck lipid panel in 6 months  Obesity (BMI 30 0-34  9)  Patient lost 6 lb since October 2020  Continue to work on weight reduction  Hypothyroidism  TSH 1 20  Continue replacement with Levothyroxine  Peripheral neuropathy  Patient is doing well on Gabapentin 300 mg twice daily  Continue Vit B12 1000 mcg daily  Impaired fasting glucose  Recommended to avoid starchy foods, continue regular exercise  Check Hemoglobin A1C in 6 months  Allergic rhinitis  Take Allegra 180 mg daily  Mild intermittent asthma without complication  Symptoms are stable  No recent asthma exacerbation  Use ProAir HFA inhaler PRN  Right hip pain  Likely secondary to osteoarthritis  Patient declined scheduling x-ray of the right hip and orthopedic surgical evaluation  She will call if symptoms persist or worsen  Take Tylenol PRN for pain  HM: patient completed Covid vaccination this month  Schedule follow-up office visit in 6 months  Check labs prior to next visit  Diagnoses and all orders for this visit:    Essential hypertension  -     CBC and differential; Future  -     Comprehensive metabolic panel; Future  -     POCT ECG    Dizziness  -     POCT ECG  -     meclizine (ANTIVERT) 12 5 MG tablet; Take 1 tablet (12 5 mg total) by mouth every 8 (eight) hours as needed for dizziness    Mixed hyperlipidemia  -     Lipid panel; Future    Obesity (BMI 30 0-34  9)    Acquired hypothyroidism  -     TSH, 3rd generation with Free T4 reflex; Future    Peripheral polyneuropathy    Impaired fasting glucose  -     Comprehensive metabolic panel;  Future  -     Hemoglobin A1C; Future    Seasonal allergic rhinitis due to pollen    Mild intermittent asthma without complication    Right hip pain    Other orders  - fexofenadine (ALLEGRA) 180 MG tablet; Take 180 mg by mouth daily          Subjective:      Patient ID: Carlos Pressley is a 79 y o  female  HPI     Patient presents for 6 month follow-up office visit  PMHx: HTN, IFG, Obesity, Hypothyroidism, Hyperlipidemia, GERD, AR, Asthma, peripheral neuropathy, OA  Reviewed current medications, blood test results from March 30, 2021  TSH 1 20, cholesterol 206 ( improved from  9/20), HDL 57, triglycerides 133 (previously 175),   Fasting blood sugar 119, creatinine 0 73, potassium 4 6  HTN - patient takes Losartan / HCTZ 100/12 5 mg daily  She did not take her blood pressure medicine this morning yet  Patient reports feeling dizzy before she goes to bed and rolling over in bed for the past 2 weeks  Denies any associated symptoms such as heart palpitations, chest pain, shortness of breaths  She has not been checking blood pressure at home  Stress echo done in May 2016 was negative for stress - induced cardiac ischemia  Hyperlipidemia - improved since September 2020 with dietary modifications  Patient tries to follow a low-cholesterol diet, cut down on sweets, candies  Lost 6 lb since 10/20  Asthma -symptoms symptoms are stable  Patient does not use any inhalers currently  Seasonal allergies - started taking Allegra 180 mg daily 2 days ago  Denies cough  C/o mild nasal congestion  No headache    C/o pain in right hip increasing with ambulation  Reports no falls  Denies back pain  Hypothyroidism - patient takes Levothyroxine 112 mcg 1 tablet on Monday through Friday and 1/2 tablet on Saturday and Sunday  Denies fatigue  Peripheral neuropathy - reports no numbness in feet  Pain in great toes improved after starting on Gabapentin, currently takes 300 mg twice daily  Patient had normal mammogram in December 2020  DEXA scan done in February 2021 showed normal bone mineral density      Patient walks, takes calcium with Vit D supplements  Family history is positive for colon CA  Last colonoscopy performed by colorectal surgeon Dr Luis A Harvey in August 2012  Denies tobacco use  Patient completed Covid vaccination this month  Reports no side effects  The following portions of the patient's history were reviewed and updated as appropriate: allergies, current medications, past medical history, past social history, past surgical history and problem list     Review of Systems   Constitutional: Negative for activity change, appetite change, chills, fatigue and fever  HENT: Positive for congestion (mild )  Negative for ear pain, hearing loss, mouth sores, nosebleeds, sore throat, tinnitus and trouble swallowing  Eyes: Negative  Respiratory: Negative for cough, chest tightness, shortness of breath and wheezing  Cardiovascular: Negative for chest pain, palpitations and leg swelling  Gastrointestinal: Negative for abdominal pain, blood in stool, constipation, diarrhea, nausea and vomiting  Genitourinary: Negative for difficulty urinating, dysuria, flank pain, frequency and hematuria  Musculoskeletal: Positive for arthralgias (right hip, knees)  Negative for back pain, gait problem, joint swelling, myalgias and neck pain  Neurological: Positive for dizziness  Negative for syncope, numbness and headaches  Hematological: Negative  Psychiatric/Behavioral: Negative for dysphoric mood and sleep disturbance  The patient is not nervous/anxious  Objective:      /82 (BP Location: Left arm, Patient Position: Sitting, Cuff Size: Standard)   Pulse 56   Temp (!) 97 4 °F (36 3 °C) (Tympanic)   Resp 14   Ht 5' 6 5" (1 689 m)   Wt 88 5 kg (195 lb)   SpO2 97%   BMI 31 00 kg/m²          Physical Exam  Vitals signs and nursing note reviewed  Constitutional:       Appearance: Normal appearance  She is obese  HENT:      Head: Normocephalic and atraumatic        Right Ear: Tympanic membrane and external ear normal       Left Ear: Tympanic membrane and external ear normal    Eyes:      Conjunctiva/sclera: Conjunctivae normal       Pupils: Pupils are equal, round, and reactive to light  Neck:      Musculoskeletal: Normal range of motion and neck supple  Vascular: No carotid bruit  Cardiovascular:      Rate and Rhythm: Normal rate and regular rhythm  Heart sounds: No murmur  Pulmonary:      Effort: Pulmonary effort is normal       Breath sounds: Normal breath sounds  Abdominal:      General: Bowel sounds are normal  There is no distension  Palpations: Abdomen is soft  Tenderness: There is no abdominal tenderness  Musculoskeletal:      Right lower leg: No edema  Left lower leg: No edema  Comments: Right hip: mild pain with ROM  No joints swelling or tenderness  Skin:     General: Skin is warm and dry  Findings: No rash  Neurological:      General: No focal deficit present  Mental Status: She is alert  Motor: No weakness        Gait: Gait normal    Psychiatric:         Mood and Affect: Mood normal

## 2021-04-20 NOTE — ASSESSMENT & PLAN NOTE
BP today 120/82  Continue Losartan/ HCTZ 100/12 5 mg daily  Recommended to stay well hydrated  Check blood pressure at home and call with blood pressure log in 7-10 days

## 2021-04-20 NOTE — ASSESSMENT & PLAN NOTE
Likely secondary to osteoarthritis  Patient declined scheduling x-ray of the right hip and orthopedic surgical evaluation  She will call if symptoms persist or worsen  Take Tylenol PRN for pain

## 2021-04-20 NOTE — ASSESSMENT & PLAN NOTE
Symptoms are likely secondary to BPV  Will check EKG to rule out cardiac etiology  EKG done in the office showed normal sinus rhythm, 63 beats per minute  No acute ST- T wave changes  Recommended to stay well hydrated, avoid rapid position changes  Take Meclizine 12 5 mg at bedtime and every 8 hr  PRN during the day  Call office if symptoms persist or worsen, will refer to ENT for further evaluation

## 2021-08-09 DIAGNOSIS — E03.9 ACQUIRED HYPOTHYROIDISM: ICD-10-CM

## 2021-08-09 DIAGNOSIS — G62.9 PERIPHERAL POLYNEUROPATHY: ICD-10-CM

## 2021-08-09 RX ORDER — GABAPENTIN 300 MG/1
CAPSULE ORAL
Qty: 180 CAPSULE | Refills: 3 | Status: SHIPPED | OUTPATIENT
Start: 2021-08-09 | End: 2022-08-08

## 2021-08-09 RX ORDER — LEVOTHYROXINE SODIUM 112 UG/1
TABLET ORAL
Qty: 90 TABLET | Refills: 3 | Status: SHIPPED | OUTPATIENT
Start: 2021-08-09 | End: 2021-10-22 | Stop reason: SDUPTHER

## 2021-08-17 ENCOUNTER — OFFICE VISIT (OUTPATIENT)
Dept: FAMILY MEDICINE CLINIC | Facility: CLINIC | Age: 71
End: 2021-08-17
Payer: COMMERCIAL

## 2021-08-17 VITALS
HEART RATE: 68 BPM | RESPIRATION RATE: 16 BRPM | HEIGHT: 67 IN | WEIGHT: 199.2 LBS | TEMPERATURE: 98.4 F | BODY MASS INDEX: 31.27 KG/M2 | DIASTOLIC BLOOD PRESSURE: 82 MMHG | SYSTOLIC BLOOD PRESSURE: 138 MMHG

## 2021-08-17 DIAGNOSIS — T63.444A BEE STING, UNDETERMINED INTENT, INITIAL ENCOUNTER: Primary | ICD-10-CM

## 2021-08-17 PROBLEM — T63.441A BEE STING: Status: ACTIVE | Noted: 2021-08-17

## 2021-08-17 PROCEDURE — 3075F SYST BP GE 130 - 139MM HG: CPT | Performed by: FAMILY MEDICINE

## 2021-08-17 PROCEDURE — 1036F TOBACCO NON-USER: CPT | Performed by: FAMILY MEDICINE

## 2021-08-17 PROCEDURE — 1160F RVW MEDS BY RX/DR IN RCRD: CPT | Performed by: FAMILY MEDICINE

## 2021-08-17 PROCEDURE — 3008F BODY MASS INDEX DOCD: CPT | Performed by: FAMILY MEDICINE

## 2021-08-17 PROCEDURE — 99213 OFFICE O/P EST LOW 20 MIN: CPT | Performed by: FAMILY MEDICINE

## 2021-08-17 PROCEDURE — 3079F DIAST BP 80-89 MM HG: CPT | Performed by: FAMILY MEDICINE

## 2021-08-17 RX ORDER — PREDNISONE 20 MG/1
20 TABLET ORAL DAILY
Qty: 7 TABLET | Refills: 0 | Status: SHIPPED | OUTPATIENT
Start: 2021-08-17 | End: 2021-08-24

## 2021-08-17 NOTE — ASSESSMENT & PLAN NOTE
Patient had bee sting 2 days ago  No anaphylactic allergy to bee stings  No need for EpiPen  Given diffuse nature of rashes, will prescribe prednisone 20 mg daily for 7 days  Also advised patient to use over-the-counter hydrocortisone cream to affected skin to help alleviate itch  Advised patient to apply Neosporin cream to blistered areas to prevent secondary infections  Advised patient to take 24 hour antihistamine  Follow-up in 1 week as as needed if symptoms do not improve

## 2021-08-17 NOTE — PROGRESS NOTES
Assessment/Plan:    Bee sting    Patient had bee sting 2 days ago  No anaphylactic allergy to bee stings  No need for EpiPen  Given diffuse nature of rashes, will prescribe prednisone 20 mg daily for 7 days  Also advised patient to use over-the-counter hydrocortisone cream to affected skin to help alleviate itch  Advised patient to apply Neosporin cream to blistered areas to prevent secondary infections  Advised patient to take 24 hour antihistamine  Follow-up in 1 week as as needed if symptoms do not improve  Patient should receive vaccination at next visit given her sick symptoms  Diagnoses and all orders for this visit:    Bee sting, undetermined intent, initial encounter  -     predniSONE 20 mg tablet; Take 1 tablet (20 mg total) by mouth daily for 7 days    Other orders  -     Cancel: PNEUMOCOCCAL POLYSACCHARIDE VACCINE 23-VALENT =>1YO SQ IM          Subjective:      Patient ID: Edmund Peña is a 79 y o  female  HPI    Patient was mowing the grass 2 days ago and got stung by bees /insects  Was only wearing shorts and high socks  Once she noticed she got stung she moved away  Only taken some ibuprofen which helped with pain  Initially did have fevers  But no other symptoms, other than mild swelling, intense itching of both lower extremities  Occasional pain at affected sites  Did have a couple of blisters that popped  Clear discharge  Not currently on antibiotics  No similar history in the past     Denies any shortness of breath, difficulty breathing, chest pain, palpitation, difficulty swallowing, throat swelling, congestion, problems with vision or hearing, nausea, vomiting, diarrhea  Patient does not have any known drug allergies  Patient only has seasonal allergies    The following portions of the patient's history were reviewed and updated as appropriate: allergies, current medications, past family history, past medical history, past social history, past surgical history and problem list     Review of Systems   All other systems reviewed and are negative  Objective:      /82 (BP Location: Left arm, Patient Position: Sitting, Cuff Size: Adult)   Pulse 68   Temp 98 4 °F (36 9 °C) (Tympanic)   Resp 16   Ht 5' 6 5" (1 689 m)   Wt 90 4 kg (199 lb 3 2 oz)   BMI 31 67 kg/m²          Physical Exam  Vitals and nursing note reviewed  Constitutional:       General: She is not in acute distress  HENT:      Head: Normocephalic and atraumatic  Mouth/Throat:      Mouth: Mucous membranes are moist       Pharynx: Oropharynx is clear  Eyes:      Conjunctiva/sclera: Conjunctivae normal    Cardiovascular:      Rate and Rhythm: Normal rate and regular rhythm  Pulses: Normal pulses  Heart sounds: No murmur heard  Comments:  Distal posterior tibial and dorsalis pedis pulses were intact bilaterally  Pulmonary:      Breath sounds: Normal breath sounds  No wheezing  Musculoskeletal:      Comments:   Mild swelling in ankle noted bilaterally  Skin:     General: Skin is warm and dry  Capillary Refill: Capillary refill takes less than 2 seconds  Comments:   Multiple rashes noted on both lower extremities  Left anterior shin  And right posterior calf also had a small vesicle  All rashes had an erythematous base that was blanching with pressure  Neurological:      Mental Status: She is alert

## 2021-10-20 LAB
ALBUMIN SERPL-MCNC: 4.5 G/DL (ref 3.6–5.1)
ALBUMIN/GLOB SERPL: 1.9 (CALC) (ref 1–2.5)
ALP SERPL-CCNC: 63 U/L (ref 37–153)
ALT SERPL-CCNC: 16 U/L (ref 6–29)
AST SERPL-CCNC: 14 U/L (ref 10–35)
BASOPHILS # BLD AUTO: 77 CELLS/UL (ref 0–200)
BASOPHILS NFR BLD AUTO: 0.8 %
BILIRUB SERPL-MCNC: 0.8 MG/DL (ref 0.2–1.2)
BUN SERPL-MCNC: 11 MG/DL (ref 7–25)
BUN/CREAT SERPL: ABNORMAL (CALC) (ref 6–22)
CALCIUM SERPL-MCNC: 9.2 MG/DL (ref 8.6–10.4)
CHLORIDE SERPL-SCNC: 103 MMOL/L (ref 98–110)
CHOLEST SERPL-MCNC: 222 MG/DL
CHOLEST/HDLC SERPL: 3.5 (CALC)
CO2 SERPL-SCNC: 28 MMOL/L (ref 20–32)
CREAT SERPL-MCNC: 0.7 MG/DL (ref 0.6–0.93)
EOSINOPHIL # BLD AUTO: 173 CELLS/UL (ref 15–500)
EOSINOPHIL NFR BLD AUTO: 1.8 %
ERYTHROCYTE [DISTWIDTH] IN BLOOD BY AUTOMATED COUNT: 13.1 % (ref 11–15)
GLOBULIN SER CALC-MCNC: 2.4 G/DL (CALC) (ref 1.9–3.7)
GLUCOSE SERPL-MCNC: 101 MG/DL (ref 65–99)
HBA1C MFR BLD: 5.4 % OF TOTAL HGB
HCT VFR BLD AUTO: 39.8 % (ref 35–45)
HDLC SERPL-MCNC: 63 MG/DL
HGB BLD-MCNC: 13.2 G/DL (ref 11.7–15.5)
LDLC SERPL CALC-MCNC: 134 MG/DL (CALC)
LYMPHOCYTES # BLD AUTO: 1363 CELLS/UL (ref 850–3900)
LYMPHOCYTES NFR BLD AUTO: 14.2 %
MCH RBC QN AUTO: 29 PG (ref 27–33)
MCHC RBC AUTO-ENTMCNC: 33.2 G/DL (ref 32–36)
MCV RBC AUTO: 87.5 FL (ref 80–100)
MONOCYTES # BLD AUTO: 595 CELLS/UL (ref 200–950)
MONOCYTES NFR BLD AUTO: 6.2 %
NEUTROPHILS # BLD AUTO: 7392 CELLS/UL (ref 1500–7800)
NEUTROPHILS NFR BLD AUTO: 77 %
NONHDLC SERPL-MCNC: 159 MG/DL (CALC)
PLATELET # BLD AUTO: 360 THOUSAND/UL (ref 140–400)
PMV BLD REES-ECKER: 10 FL (ref 7.5–12.5)
POTASSIUM SERPL-SCNC: 3.8 MMOL/L (ref 3.5–5.3)
PROT SERPL-MCNC: 6.9 G/DL (ref 6.1–8.1)
RBC # BLD AUTO: 4.55 MILLION/UL (ref 3.8–5.1)
SL AMB EGFR AFRICAN AMERICAN: 102 ML/MIN/1.73M2
SL AMB EGFR NON AFRICAN AMERICAN: 88 ML/MIN/1.73M2
SODIUM SERPL-SCNC: 140 MMOL/L (ref 135–146)
TRIGL SERPL-MCNC: 139 MG/DL
TSH SERPL-ACNC: 2.49 MIU/L (ref 0.4–4.5)
WBC # BLD AUTO: 9.6 THOUSAND/UL (ref 3.8–10.8)

## 2021-10-22 ENCOUNTER — OFFICE VISIT (OUTPATIENT)
Dept: FAMILY MEDICINE CLINIC | Facility: CLINIC | Age: 71
End: 2021-10-22
Payer: COMMERCIAL

## 2021-10-22 VITALS
HEIGHT: 67 IN | BODY MASS INDEX: 30.29 KG/M2 | RESPIRATION RATE: 14 BRPM | DIASTOLIC BLOOD PRESSURE: 82 MMHG | OXYGEN SATURATION: 97 % | SYSTOLIC BLOOD PRESSURE: 126 MMHG | TEMPERATURE: 97.5 F | WEIGHT: 193 LBS | HEART RATE: 68 BPM

## 2021-10-22 DIAGNOSIS — Z12.11 SCREENING FOR COLON CANCER: ICD-10-CM

## 2021-10-22 DIAGNOSIS — E78.2 MIXED HYPERLIPIDEMIA: ICD-10-CM

## 2021-10-22 DIAGNOSIS — B37.2 CANDIDAL DERMATITIS: ICD-10-CM

## 2021-10-22 DIAGNOSIS — Z23 NEED FOR INFLUENZA VACCINATION: ICD-10-CM

## 2021-10-22 DIAGNOSIS — G62.9 PERIPHERAL POLYNEUROPATHY: ICD-10-CM

## 2021-10-22 DIAGNOSIS — I10 PRIMARY HYPERTENSION: Primary | ICD-10-CM

## 2021-10-22 DIAGNOSIS — J30.1 SEASONAL ALLERGIC RHINITIS DUE TO POLLEN: ICD-10-CM

## 2021-10-22 DIAGNOSIS — R73.01 IMPAIRED FASTING GLUCOSE: ICD-10-CM

## 2021-10-22 DIAGNOSIS — E03.9 ACQUIRED HYPOTHYROIDISM: ICD-10-CM

## 2021-10-22 DIAGNOSIS — Z12.31 ENCOUNTER FOR SCREENING MAMMOGRAM FOR MALIGNANT NEOPLASM OF BREAST: ICD-10-CM

## 2021-10-22 DIAGNOSIS — E66.9 OBESITY (BMI 30.0-34.9): ICD-10-CM

## 2021-10-22 PROCEDURE — 3074F SYST BP LT 130 MM HG: CPT | Performed by: FAMILY MEDICINE

## 2021-10-22 PROCEDURE — 1036F TOBACCO NON-USER: CPT | Performed by: FAMILY MEDICINE

## 2021-10-22 PROCEDURE — 3008F BODY MASS INDEX DOCD: CPT | Performed by: FAMILY MEDICINE

## 2021-10-22 PROCEDURE — 3079F DIAST BP 80-89 MM HG: CPT | Performed by: FAMILY MEDICINE

## 2021-10-22 PROCEDURE — 3725F SCREEN DEPRESSION PERFORMED: CPT | Performed by: FAMILY MEDICINE

## 2021-10-22 PROCEDURE — 99214 OFFICE O/P EST MOD 30 MIN: CPT | Performed by: FAMILY MEDICINE

## 2021-10-22 PROCEDURE — 3288F FALL RISK ASSESSMENT DOCD: CPT | Performed by: FAMILY MEDICINE

## 2021-10-22 PROCEDURE — 1160F RVW MEDS BY RX/DR IN RCRD: CPT | Performed by: FAMILY MEDICINE

## 2021-10-22 PROCEDURE — 1101F PT FALLS ASSESS-DOCD LE1/YR: CPT | Performed by: FAMILY MEDICINE

## 2021-10-22 PROCEDURE — 90662 IIV NO PRSV INCREASED AG IM: CPT

## 2021-10-22 PROCEDURE — G0008 ADMIN INFLUENZA VIRUS VAC: HCPCS

## 2021-10-22 RX ORDER — NYSTATIN 100000 [USP'U]/G
POWDER TOPICAL
Qty: 60 G | Refills: 3 | Status: SHIPPED | OUTPATIENT
Start: 2021-10-22

## 2021-10-22 RX ORDER — LEVOTHYROXINE SODIUM 112 UG/1
TABLET ORAL
Qty: 100 TABLET | Refills: 3
Start: 2021-10-22 | End: 2022-08-08

## 2021-11-11 ENCOUNTER — IMMUNIZATIONS (OUTPATIENT)
Dept: FAMILY MEDICINE CLINIC | Facility: HOSPITAL | Age: 71
End: 2021-11-11

## 2021-11-11 DIAGNOSIS — Z23 ENCOUNTER FOR IMMUNIZATION: Primary | ICD-10-CM

## 2021-11-11 PROCEDURE — 0001A COVID-19 PFIZER VACC 0.3 ML: CPT

## 2021-11-11 PROCEDURE — 91300 COVID-19 PFIZER VACC 0.3 ML: CPT

## 2022-02-18 DIAGNOSIS — I10 ESSENTIAL HYPERTENSION: ICD-10-CM

## 2022-02-18 RX ORDER — LOSARTAN POTASSIUM AND HYDROCHLOROTHIAZIDE 12.5; 1 MG/1; MG/1
TABLET ORAL
Qty: 90 TABLET | Refills: 3 | Status: SHIPPED | OUTPATIENT
Start: 2022-02-18

## 2022-04-21 LAB
ALBUMIN SERPL-MCNC: 4.2 G/DL (ref 3.6–5.1)
ALBUMIN/GLOB SERPL: 1.7 (CALC) (ref 1–2.5)
ALP SERPL-CCNC: 60 U/L (ref 37–153)
ALT SERPL-CCNC: 19 U/L (ref 6–29)
AST SERPL-CCNC: 16 U/L (ref 10–35)
BILIRUB SERPL-MCNC: 0.6 MG/DL (ref 0.2–1.2)
BUN SERPL-MCNC: 15 MG/DL (ref 7–25)
BUN/CREAT SERPL: NORMAL (CALC) (ref 6–22)
CALCIUM SERPL-MCNC: 9.6 MG/DL (ref 8.6–10.4)
CHLORIDE SERPL-SCNC: 102 MMOL/L (ref 98–110)
CHOLEST SERPL-MCNC: 190 MG/DL
CHOLEST/HDLC SERPL: 3.2 (CALC)
CO2 SERPL-SCNC: 32 MMOL/L (ref 20–32)
CREAT SERPL-MCNC: 0.66 MG/DL (ref 0.6–0.93)
GLOBULIN SER CALC-MCNC: 2.5 G/DL (CALC) (ref 1.9–3.7)
GLUCOSE SERPL-MCNC: 98 MG/DL (ref 65–99)
HDLC SERPL-MCNC: 60 MG/DL
LDLC SERPL CALC-MCNC: 98 MG/DL (CALC)
NONHDLC SERPL-MCNC: 130 MG/DL (CALC)
POTASSIUM SERPL-SCNC: 3.9 MMOL/L (ref 3.5–5.3)
PROT SERPL-MCNC: 6.7 G/DL (ref 6.1–8.1)
SL AMB EGFR AFRICAN AMERICAN: 103 ML/MIN/1.73M2
SL AMB EGFR NON AFRICAN AMERICAN: 89 ML/MIN/1.73M2
SODIUM SERPL-SCNC: 141 MMOL/L (ref 135–146)
TRIGL SERPL-MCNC: 196 MG/DL
TSH SERPL-ACNC: 0.99 MIU/L (ref 0.4–4.5)

## 2022-04-25 ENCOUNTER — OFFICE VISIT (OUTPATIENT)
Dept: FAMILY MEDICINE CLINIC | Facility: CLINIC | Age: 72
End: 2022-04-25
Payer: COMMERCIAL

## 2022-04-25 VITALS
WEIGHT: 192.8 LBS | HEIGHT: 66 IN | BODY MASS INDEX: 30.98 KG/M2 | TEMPERATURE: 98.3 F | OXYGEN SATURATION: 97 % | DIASTOLIC BLOOD PRESSURE: 100 MMHG | SYSTOLIC BLOOD PRESSURE: 150 MMHG | RESPIRATION RATE: 20 BRPM | HEART RATE: 88 BPM

## 2022-04-25 DIAGNOSIS — J45.20 MILD INTERMITTENT ASTHMA WITHOUT COMPLICATION: ICD-10-CM

## 2022-04-25 DIAGNOSIS — J30.1 SEASONAL ALLERGIC RHINITIS DUE TO POLLEN: ICD-10-CM

## 2022-04-25 DIAGNOSIS — E78.2 MIXED HYPERLIPIDEMIA: ICD-10-CM

## 2022-04-25 DIAGNOSIS — Z00.00 MEDICARE ANNUAL WELLNESS VISIT, SUBSEQUENT: ICD-10-CM

## 2022-04-25 DIAGNOSIS — E66.9 OBESITY (BMI 30.0-34.9): ICD-10-CM

## 2022-04-25 DIAGNOSIS — G62.9 PERIPHERAL POLYNEUROPATHY: ICD-10-CM

## 2022-04-25 DIAGNOSIS — E03.9 ACQUIRED HYPOTHYROIDISM: ICD-10-CM

## 2022-04-25 DIAGNOSIS — R73.01 IMPAIRED FASTING GLUCOSE: ICD-10-CM

## 2022-04-25 DIAGNOSIS — I10 PRIMARY HYPERTENSION: Primary | ICD-10-CM

## 2022-04-25 DIAGNOSIS — Z23 NEED FOR PNEUMOCOCCAL VACCINATION: ICD-10-CM

## 2022-04-25 PROCEDURE — 3725F SCREEN DEPRESSION PERFORMED: CPT | Performed by: FAMILY MEDICINE

## 2022-04-25 PROCEDURE — 1125F AMNT PAIN NOTED PAIN PRSNT: CPT | Performed by: FAMILY MEDICINE

## 2022-04-25 PROCEDURE — 1160F RVW MEDS BY RX/DR IN RCRD: CPT | Performed by: FAMILY MEDICINE

## 2022-04-25 PROCEDURE — G0009 ADMIN PNEUMOCOCCAL VACCINE: HCPCS

## 2022-04-25 PROCEDURE — 3077F SYST BP >= 140 MM HG: CPT | Performed by: FAMILY MEDICINE

## 2022-04-25 PROCEDURE — 99214 OFFICE O/P EST MOD 30 MIN: CPT | Performed by: FAMILY MEDICINE

## 2022-04-25 PROCEDURE — 3008F BODY MASS INDEX DOCD: CPT | Performed by: FAMILY MEDICINE

## 2022-04-25 PROCEDURE — 1170F FXNL STATUS ASSESSED: CPT | Performed by: FAMILY MEDICINE

## 2022-04-25 PROCEDURE — G0439 PPPS, SUBSEQ VISIT: HCPCS | Performed by: FAMILY MEDICINE

## 2022-04-25 PROCEDURE — 1036F TOBACCO NON-USER: CPT | Performed by: FAMILY MEDICINE

## 2022-04-25 PROCEDURE — 90677 PCV20 VACCINE IM: CPT

## 2022-04-25 PROCEDURE — 3080F DIAST BP >= 90 MM HG: CPT | Performed by: FAMILY MEDICINE

## 2022-04-25 PROCEDURE — 1101F PT FALLS ASSESS-DOCD LE1/YR: CPT | Performed by: FAMILY MEDICINE

## 2022-04-25 PROCEDURE — 3288F FALL RISK ASSESSMENT DOCD: CPT | Performed by: FAMILY MEDICINE

## 2022-04-25 NOTE — PROGRESS NOTES
Assessment/Plan:    Hypertension  BP on retake 150/100  Patient did not take blood pressure medication this morning yet  Usually she takes Losartan / HCTZ 26653 5 mg around 10:00 am   Admits to dietary indiscretions, eating salty foods over the Easter weekend  Recommended to continue Losartan/ HCTZ 100/12 5 mg daily in the morning on a regular basis  Follow low-sodium diet  Stay well hydrated  Monitor blood pressure at home and bring blood pressure log in 2 weeks  Hypothyroidism  Continue current dose of Levothyroxine  Hyperlipidemia  Follow a low-cholesterol, low-fat diet  Restart taking Omega 3 1000 mg 1 capsule twice daily  Recheck lipid panel in 6 months  Obesity (BMI 30 0-34  9)  Work on dietary and lifestyle modifications, lose weight  Impaired fasting glucose  Recommended to avoid starchy foods, concentrated sweets  Check Hemoglobin A1C in 6 months  Allergic rhinitis  Symptoms are stable  Take Fexofenadine 180 mg daily p r n  during allergy seasons  Mild intermittent asthma without complication  No recent asthma exacerbation  Use ProAir HFA inhaler PRN  Peripheral neuropathy  Symptoms controlled on Gabapentin 300 mg twice daily  Continue Vit B12 1000 mcg daily  HM:  schedule screening mammogram as ordered in October 2021  Schedule nurse visit for BP check in 2 weeks  Schedule follow-up office visit with labs in 6 months  Diagnoses and all orders for this visit:    Primary hypertension  -     CBC and differential; Future  -     Comprehensive metabolic panel; Future    Acquired hypothyroidism  -     TSH, 3rd generation with Free T4 reflex; Future    Mixed hyperlipidemia  -     Comprehensive metabolic panel; Future  -     Lipid panel; Future    Obesity (BMI 30 0-34  9)    Impaired fasting glucose  -     Comprehensive metabolic panel;  Future  -     Hemoglobin A1C; Future    Seasonal allergic rhinitis due to pollen    Mild intermittent asthma without complication    Peripheral polyneuropathy    Medicare annual wellness visit, subsequent    Need for pneumococcal vaccination  -     Pneumococcal Conjugate Vaccine 20-valent (PCV20)          Subjective:      Patient ID: Fareed Mace is a 70 y o  female  HPI     Patient resents for 6 month follow-up office visit  PMHx: HTN, IFG, Obesity, Hypothyroidism, Hyperlipidemia, GERD, AR, Asthma, peripheral neuropathy, OA  Reviewed current medications, blood work results from April 21, 2022  Cholesterol 90 ( improved from 222 in October 2021), HDL 60, LDL 98 ( was 134),  triglycerides 196  Fasting blood sugar 98, creatinine 0 66, potassium 3 9  TSH 0 99  HTN - BP is elevated today  Patient did not take blood pressure medication this morning yet  Usually she takes Losartan / HCTZ 63011 5 mg around 10:00 am   Admits to dietary indiscretions, eating salty foods over the Easter weekend  Denies chest pain, shortness of breath, dizziness  Hyperlipidemia - patient stopped taking Red yeast rice, Omega 3 capsules  Tries to eat healthy, walks  She is reluctant to start on statin therapy  Seasonal allergy/ Asthma - symptoms are stable  Patient takes Fexofenadine 180 mg PRN during allergy seasons  Reports no cough, no wheezing  Peripheral neuropathy - symptoms controlled Gabapentin 300 mg twice daily  Patient did not schedule screening mammogram as recommended at the last visit  DEXA scan done in February 2021 showed normal bone mineral density  Patient takes calcium vitamin-D supplements  Family history is positive colon CA in her half brother  Colonoscopy performed by colorectal surgeon Dr Dwain Clarke in December 2021, one polyp was removed  Dr Dwain Clarke recommended to repeat colonoscopy in 5 years  Received COVID booster in 11/2021      The following portions of the patient's history were reviewed and updated as appropriate: allergies, current medications, past medical history, past social history, past surgical history and problem list     Review of Systems   Constitutional: Negative for activity change, appetite change, chills, fatigue and fever  HENT: Negative for congestion, ear pain, hearing loss, nosebleeds, sore throat and trouble swallowing  Eyes: Negative  Respiratory: Negative for cough, chest tightness, shortness of breath and wheezing  Cardiovascular: Negative for chest pain, palpitations and leg swelling  Gastrointestinal: Positive for constipation (occasional)  Negative for abdominal pain, blood in stool, diarrhea, nausea and vomiting  Genitourinary: Negative for difficulty urinating, dysuria, flank pain, frequency and hematuria  Musculoskeletal: Positive for arthralgias  Negative for back pain, gait problem, joint swelling and neck pain  Skin: Negative for rash  Neurological: Negative for dizziness, syncope and headaches  Hematological: Negative  Psychiatric/Behavioral: Negative for dysphoric mood and sleep disturbance  The patient is not nervous/anxious  Objective:      /100 (BP Location: Left arm, Patient Position: Sitting, Cuff Size: Standard)   Pulse 88   Temp 98 3 °F (36 8 °C) (Tympanic)   Resp 20   Ht 5' 6 25" (1 683 m)   Wt 87 5 kg (192 lb 12 8 oz)   SpO2 97%   BMI 30 88 kg/m²          Physical Exam  Vitals and nursing note reviewed  Constitutional:       Appearance: Normal appearance  She is obese  HENT:      Head: Normocephalic and atraumatic  Eyes:      Conjunctiva/sclera: Conjunctivae normal       Pupils: Pupils are equal, round, and reactive to light  Neck:      Vascular: No carotid bruit  Cardiovascular:      Rate and Rhythm: Normal rate and regular rhythm  Heart sounds: No murmur heard  Pulmonary:      Effort: Pulmonary effort is normal       Breath sounds: Normal breath sounds  Abdominal:      General: There is no distension        Palpations: Abdomen is soft       Tenderness: There is no abdominal tenderness  Musculoskeletal:         General: No swelling or tenderness  Normal range of motion  Cervical back: Normal range of motion and neck supple  Right lower leg: No edema  Left lower leg: No edema  Skin:     General: Skin is warm and dry  Findings: No rash  Neurological:      General: No focal deficit present  Mental Status: She is alert and oriented to person, place, and time  Motor: No weakness        Gait: Gait normal    Psychiatric:         Mood and Affect: Mood normal

## 2022-04-25 NOTE — PROGRESS NOTES
Chief Complaint   Patient presents with   [facility] Wellness Visit     Subsequent   Follow-up     6 months and review labs  Health Maintenance   Topic Date Due    DTaP,Tdap,and Td Vaccines (1 - Tdap) Never done    Pneumococcal Vaccine: 65+ Years (2 of 2 - PPSV23) 04/29/2021    Medicare Annual Wellness Visit (AWV)  12/03/2021    BMI: Followup Plan  04/20/2022    Breast Cancer Screening: Mammogram  12/08/2022    Fall Risk  04/25/2023    Depression Screening  04/25/2023    BMI: Adult  04/25/2023    Colorectal Cancer Screening  12/14/2026    Hepatitis C Screening  Completed    Osteoporosis Screening  Completed    Influenza Vaccine  Completed    COVID-19 Vaccine  Completed    HIB Vaccine  Aged Out    Hepatitis B Vaccine  Aged Out    IPV Vaccine  Aged Out    Hepatitis A Vaccine  Aged Out    Meningococcal ACWY Vaccine  Aged Out    HPV Vaccine  Aged Out      Assessment and Plan:     Problem List Items Addressed This Visit        Endocrine    Hypothyroidism    Impaired fasting glucose       Respiratory    Allergic rhinitis    Mild intermittent asthma without complication       Cardiovascular and Mediastinum    Hypertension - Primary       Nervous and Auditory    Peripheral neuropathy       Other    Hyperlipidemia    Medicare annual wellness visit, subsequent    Obesity (BMI 30 0-34 9)      Other Visit Diagnoses     Encounter for immunization        Need for pneumococcal vaccination            BMI Counseling: Body mass index is 30 88 kg/m²  The BMI is above normal  Nutrition recommendations include decreasing portion sizes, encouraging healthy choices of fruits and vegetables, decreasing fast food intake, consuming healthier snacks, limiting drinks that contain sugar, moderation in carbohydrate intake, increasing intake of lean protein, reducing intake of saturated and trans fat and reducing intake of cholesterol  Exercise recommendations include moderate physical activity 150 minutes/week   No pharmacotherapy was ordered  Rationale for BMI follow-up plan is due to patient being overweight or obese  Depression Screening and Follow-up Plan: Patient was screened for depression during today's encounter  They screened negative with a PHQ-2 score of 0  Preventive health issues were discussed with patient, and age appropriate screening tests were ordered as noted in patient's After Visit Summary  Personalized health advice and appropriate referrals for health education or preventive services given if needed, as noted in patient's After Visit Summary  History of Present Illness:     Patient presents for Medicare Annual Wellness visit    Patient Care Team:  Lindsey Goff MD as PCP - General  Lindsey Goff MD as PCP - [address] [place] [address] (RTE)     Problem List:     Patient Active Problem List   Diagnosis    Allergic rhinitis    Diverticulosis    Esophageal reflux    Hyperlipidemia    Hypertension    Hypothyroidism    Impaired fasting glucose    Mild intermittent asthma without complication    Seborrheic keratosis    Right groin pain    Medicare annual wellness visit, subsequent    Obesity (BMI 30 0-34  9)    Peripheral neuropathy    Seborrheic keratosis, inflamed    Dizziness    Right hip pain    Bee sting      Past Medical and Surgical History:     Past Medical History:   Diagnosis Date    Abnormal EKG     last assessed 4/29/16    Chest skin lesion     last assessed 5/6/16    Depression with anxiety     last assessed 7/23/12    Hearing loss     last assessed 7/23/12     Past Surgical History:   Procedure Laterality Date    COLONOSCOPY      fiberoptic 2012    CYSTOSCOPY      HYSTERECTOMY      age 39    LUMBAR LAMINECTOMY        Family History:     Family History   Problem Relation Age of Onset    Coronary artery disease Mother     Hypertension Mother     Irritable bowel syndrome Mother     Lung cancer Father     Hypertension Father    [facility] Other Father elevated cholesterol    Colon cancer Half-Brother     Colon cancer Family     Diabetes Family     Ovarian cancer Sister     No Known Problems Daughter     Diabetes Maternal Grandmother     Lymphoma Maternal Grandfather     No Known Problems Paternal Grandmother     No Known Problems Paternal Grandfather     Cervical cancer Sister     No Known Problems Son     No Known Problems Half-Brother       Social History:     Social History     Socioeconomic History    Marital status:       Spouse name: Not on file    Number of children: 2    Years of education: Not on file    Highest education level: Not on file   Occupational History    Not on file   Tobacco Use    Smoking status: Former Smoker    Smokeless tobacco: Never Used   Substance and Sexual Activity    Alcohol use: Yes     Comment: 3 glasses wine/week    Drug use: Not on file    Sexual activity: Not on file   Other Topics Concern    Not on file   Social History Narrative    Not on file     Social Determinants of Health     Financial Resource Strain: Not on file   Food Insecurity: Not on file   Transportation Needs: Not on file   Physical Activity: Not on file   Stress: Not on file   Social Connections: Not on file   Intimate Partner Violence: Not on file   Housing Stability: Not on file      Medications and Allergies:     Current Outpatient Medications   Medication Sig Dispense Refill    ascorbic acid (VITAMIN C) 500 mg tablet Take by mouth      aspirin (ECOTRIN LOW STRENGTH) 81 mg EC tablet Take 1 tablet by mouth daily      Calcium Carbonate-Vitamin D (CALCIUM 600+D) 600-400 MG-UNIT per tablet Take 1 tablet by mouth daily      cyanocobalamin (VITAMIN B-12) 1,000 mcg tablet Take by mouth daily      fexofenadine (ALLEGRA) 180 MG tablet Take 180 mg by mouth daily (Patient not taking: Reported on 8/17/2021)      gabapentin (NEURONTIN) 300 mg capsule TAKE 1 CAPSULE TWICE A  capsule 3    levothyroxine 112 mcg tablet Take 1 tablet daily Monday through Friday and 1/2 tablet on Saturday and Sunday 100 tablet 3    losartan-hydrochlorothiazide (HYZAAR) 100-12 5 MG per tablet TAKE 1 TABLET DAILY 90 tablet 3    meclizine (ANTIVERT) 12 5 MG tablet Take 1 tablet (12 5 mg total) by mouth every 8 (eight) hours as needed for dizziness (Patient not taking: Reported on 8/17/2021) 30 tablet 1    nystatin (MYCOSTATIN) powder Apply topically twice daily as directed 60 g 3    Omega-3 Fatty Acids (FISH OIL) 1200 MG CAPS Take 1 capsule by mouth 2 (two) times a day       No current facility-administered medications for this visit  No Known Allergies   Immunizations:     Immunization History   Administered Date(s) Administered    COVID-19 PFIZER VACCINE 0 3 ML IM 03/11/2021, 04/03/2021, 11/11/2021    Influenza, high dose seasonal 0 7 mL 09/28/2018, 10/01/2019, 10/01/2020, 10/22/2021    Influenza, seasonal, injectable 11/29/2013, 10/10/2014    Pneumococcal Conjugate 13-Valent 04/29/2016    Pneumococcal Polysaccharide PPV23 08/12/2011      Health Maintenance:         Topic Date Due    Breast Cancer Screening: Mammogram  12/08/2022    Colorectal Cancer Screening  12/14/2026    Hepatitis C Screening  Completed         Topic Date Due    DTaP,Tdap,and Td Vaccines (1 - Tdap) Never done    Pneumococcal Vaccine: 65+ Years (2 of 2 - PPSV23) 04/29/2021      Medicare Health Risk Assessment:     BP (!) 190/100 (BP Location: Left arm, Patient Position: Sitting, Cuff Size: Adult)   Pulse 88   Temp 98 3 °F (36 8 °C) (Tympanic)   Resp 20   Ht 5' 6 25" (1 683 m)   Wt 87 5 kg (192 lb 12 8 oz)   SpO2 97%   BMI 30 88 kg/m²      Arina Pritchard is here for her Subsequent Wellness visit  Health Risk Assessment:   Patient rates overall health as very good  Patient feels that their physical health rating is slightly better  Patient is very satisfied with their life  Eyesight was rated as same  Hearing was rated as same   Patient feels that their emotional and mental health rating is slightly better  Patients states they are never, rarely angry  Patient states they are sometimes unusually tired/fatigued  Pain experienced in the last 7 days has been some  Patient's pain rating has been 3/10  Patient states that she has experienced no weight loss or gain in last 6 months  Depression Screening:   PHQ-2 Score: 0      Fall Risk Screening: In the past year, patient has experienced: no history of falling in past year      Urinary Incontinence Screening:   Patient has leaked urine accidently in the last six months  Home Safety:  Patient does not have trouble with stairs inside or outside of their home  Patient has working smoke alarms and has no working carbon monoxide detector  Home safety hazards include: none  Nutrition:   Current diet is Regular and Frequent junk food  Medications:   Patient is currently taking over-the-counter supplements  OTC medications include: see medication list  Patient is able to manage medications  Activities of Daily Living (ADLs)/Instrumental Activities of Daily Living (IADLs):   Walk and transfer into and out of bed and chair?: Yes  Dress and groom yourself?: Yes    Bathe or shower yourself?: Yes    Feed yourself? Yes  Do your laundry/housekeeping?: Yes  Manage your money, pay your bills and track your expenses?: Yes  Make your own meals?: Yes    Do your own shopping?: Yes    Previous Hospitalizations:   Any hospitalizations or ED visits within the last 12 months?: No      Advance Care Planning:   Living will: Yes    Durable POA for healthcare:  Yes    Advanced directive: Yes    Advanced directive counseling given: Yes      Cognitive Screening:   Provider or family/friend/caregiver concerned regarding cognition?: No    PREVENTIVE SCREENINGS      Cardiovascular Screening:    General: Screening Not Indicated and History Lipid Disorder      Diabetes Screening:     General: Screening Current      Colorectal Cancer Screening: General: Screening Current      Breast Cancer Screening:     General: Screening Current      Cervical Cancer Screening:    General: Screening Not Indicated      Osteoporosis Screening:    General: Risks and Benefits Discussed      Abdominal Aortic Aneurysm (AAA) Screening:        General: Screening Not Indicated      Lung Cancer Screening:     General: Screening Not Indicated      Hepatitis C Screening:    General: Screening Current    Screening, Brief Intervention, and Referral to Treatment (SBIRT)    Screening  Typical number of drinks in a day: 0  Typical number of drinks in a week: 3  Interpretation: Low risk drinking behavior  Single Item Drug Screening:  How often have you used an illegal drug (including marijuana) or a prescription medication for non-medical reasons in the past year? never    Single Item Drug Screen Score: 0  Interpretation: Negative screen for possible drug use disorder    Other Counseling Topics:   Car/seat belt/driving safety, skin self-exam and calcium and vitamin D intake and regular weightbearing exercise         Steven Rubio MD

## 2022-04-25 NOTE — ASSESSMENT & PLAN NOTE
Follow a low-cholesterol, low-fat diet  Restart taking Omega 3 1000 mg 1 capsule twice daily  Recheck lipid panel in 6 months

## 2022-05-10 ENCOUNTER — CLINICAL SUPPORT (OUTPATIENT)
Dept: FAMILY MEDICINE CLINIC | Facility: CLINIC | Age: 72
End: 2022-05-10

## 2022-05-10 VITALS
DIASTOLIC BLOOD PRESSURE: 88 MMHG | RESPIRATION RATE: 16 BRPM | OXYGEN SATURATION: 99 % | SYSTOLIC BLOOD PRESSURE: 144 MMHG | HEART RATE: 69 BPM

## 2022-05-10 DIAGNOSIS — I10 PRIMARY HYPERTENSION: Primary | ICD-10-CM

## 2022-06-17 ENCOUNTER — OFFICE VISIT (OUTPATIENT)
Dept: URGENT CARE | Facility: MEDICAL CENTER | Age: 72
End: 2022-06-17
Payer: COMMERCIAL

## 2022-06-17 VITALS
WEIGHT: 195 LBS | OXYGEN SATURATION: 95 % | RESPIRATION RATE: 18 BRPM | TEMPERATURE: 101.5 F | BODY MASS INDEX: 31.34 KG/M2 | HEART RATE: 90 BPM | HEIGHT: 66 IN

## 2022-06-17 DIAGNOSIS — B34.9 VIRAL SYNDROME: Primary | ICD-10-CM

## 2022-06-17 DIAGNOSIS — R50.9 FEVER, UNSPECIFIED FEVER CAUSE: ICD-10-CM

## 2022-06-17 DIAGNOSIS — R05.1 ACUTE COUGH: ICD-10-CM

## 2022-06-17 DIAGNOSIS — J02.9 SORE THROAT: ICD-10-CM

## 2022-06-17 LAB
S PYO AG THROAT QL: NEGATIVE
SARS-COV-2 AG UPPER RESP QL IA: NEGATIVE
VALID CONTROL: NORMAL

## 2022-06-17 PROCEDURE — 99213 OFFICE O/P EST LOW 20 MIN: CPT | Performed by: EMERGENCY MEDICINE

## 2022-06-17 PROCEDURE — 87811 SARS-COV-2 COVID19 W/OPTIC: CPT | Performed by: EMERGENCY MEDICINE

## 2022-06-17 PROCEDURE — 87880 STREP A ASSAY W/OPTIC: CPT | Performed by: EMERGENCY MEDICINE

## 2022-06-17 RX ORDER — ACETAMINOPHEN 325 MG/1
650 TABLET ORAL EVERY 6 HOURS PRN
Status: SHIPPED | OUTPATIENT
Start: 2022-06-17

## 2022-06-17 RX ORDER — IBUPROFEN 600 MG/1
600 TABLET ORAL EVERY 6 HOURS PRN
Status: SHIPPED | OUTPATIENT
Start: 2022-06-17

## 2022-06-17 RX ADMIN — ACETAMINOPHEN 650 MG: 325 TABLET ORAL at 10:26

## 2022-06-17 RX ADMIN — IBUPROFEN 600 MG: 600 TABLET ORAL at 10:27

## 2022-06-17 NOTE — PATIENT INSTRUCTIONS
Your evaluation suggests that your symptoms are most likely due to a viral illness, which will improve on its own with rest and fluids  Rapid strep was negative  Rapid COVID was negative  We recommend you take 600mg ibuprofen every 6 hours or tylenol 650mg every 6 hours as needed for fever  If needed, you can alternate these medications so that you take one medication every 3 hours  For instance, at noon take ibuprofen, then at 3pm take tylenol, then at 6pm take ibuprofen  Over the counter allergy medication like Claritin, Allegra or Zyrtec can help with nasal congestion and post nasal drip  Over the counter steroid nasal sprays like Flonase can help with nasal congestion and post nasal drip as well  Delsym, an over the counter cough medication may be used every 12 hours as needed  Mucinex XR (guafenisen) 600 mg tablets may be used to thin out the mucous to make it easier to cough up  You may take 1-2 tablets twice per day as needed  Salt water gargles with 1 teaspoon of salt dissolved in 6-8 oz of water as needed can help with a sore throat  Please schedule an appointment for follow up with your primary care physician this week  Return to the Emergency Department if you experience worsening cough, fever 100 4 ° F or greater  that is not controlled by Tylenol or Ibuprofen, recurrent vomiting, chest pain, shortness of breath, or any other concerning symptoms

## 2022-06-17 NOTE — PROGRESS NOTES
[facility]        NAME: Ralf Mercer is a 70 y o  female  : 1950    MRN: [id number]  DATE: 2022  TIME: 10:12 AM    Assessment and Plan   Sore throat [J02 9]  1  Sore throat  POCT rapid strepA   2  Fever, unspecified fever cause  acetaminophen (TYLENOL) tablet 650 mg    ibuprofen (MOTRIN) tablet 600 mg   3  Acute cough  Poct Covid 19 Rapid Antigen Test     10:49 AM  Rapid strep negative  Rapid COVID negative  Patient is non-toxic appearing  Vital signs are normal other than her fever  She was given Tylenol and Motrin here in the office  F/u with PCP as outpatient  RTED instructions reviewed  Patient Instructions   Your evaluation suggests that your symptoms are most likely due to a viral illness, which will improve on its own with rest and fluids  Rapid strep was negative  Rapid COVID was negative  We recommend you take 600mg ibuprofen every 6 hours or tylenol 650mg every 6 hours as needed for fever  If needed, you can alternate these medications so that you take one medication every 3 hours  For instance, at noon take ibuprofen, then at 3pm take tylenol, then at 6pm take ibuprofen  Over the counter allergy medication like Claritin, Allegra or Zyrtec can help with nasal congestion and post nasal drip  Over the counter steroid nasal sprays like Flonase can help with nasal congestion and post nasal drip as well  Delsym, an over the counter cough medication may be used every 12 hours as needed  Mucinex XR (guafenisen) 600 mg tablets may be used to thin out the mucous to make it easier to cough up  You may take 1-2 tablets twice per day as needed  Salt water gargles with 1 teaspoon of salt dissolved in 6-8 oz of water as needed can help with a sore throat  Please schedule an appointment for follow up with your primary care physician this week      Return to the Emergency Department if you experience worsening cough, fever 100 4 ° F or greater  that is not controlled by Tylenol or Ibuprofen, recurrent vomiting, chest pain, shortness of breath, or any other concerning symptoms  Follow up with PCP in 3-5 days  Proceed to  ER if symptoms worsen  Chief Complaint     Chief Complaint   Patient presents with    Sore Throat     Patient states she started on Sunday with scratchy throat, low grade temp at home sore throat worse on Wednesday  She is coughing up gray phlegm, and yellow from her nose  She denies nay SOB or CP         History of Present Illness       79-year-old female presents today for evaluation of a sore throat, laryngitis, and fever  Symptoms started 6 days ago  She states he took a COVID test when her symptoms started but has not retested since  She has not taken anything for her symptoms  She denies shortness of breath  She states that she coughs a little to clear her throat  Review of Systems   Review of Systems   Constitutional: Positive for fever  Negative for chills and fatigue  HENT: Positive for congestion, sore throat and voice change  Negative for postnasal drip and trouble swallowing  Eyes: Negative for visual disturbance  Respiratory: Positive for cough  Negative for chest tightness and shortness of breath  Gastrointestinal: Negative for abdominal pain  Genitourinary: Negative for dysuria  Musculoskeletal: Negative for back pain  Skin: Negative for rash  Allergic/Immunologic: Negative for immunocompromised state  Neurological: Negative for dizziness, light-headedness and headaches  Psychiatric/Behavioral: Negative for confusion           Current Medications       Current Outpatient Medications:     ascorbic acid (VITAMIN C) 500 mg tablet, Take by mouth, Disp: , Rfl:     aspirin (ECOTRIN LOW STRENGTH) 81 mg EC tablet, Take 1 tablet by mouth daily, Disp: , Rfl:     Calcium Carbonate-Vitamin D (CALCIUM 600+D) 600-400 MG-UNIT per tablet, Take 1 tablet by mouth daily, Disp: , Rfl:     cyanocobalamin (VITAMIN B-12) 1,000 mcg tablet, Take by mouth daily, Disp: , Rfl:     fexofenadine (ALLEGRA) 180 MG tablet, Take 180 mg by mouth daily (Patient not taking: Reported on 6/17/2022), Disp: , Rfl:     gabapentin (NEURONTIN) 300 mg capsule, TAKE 1 CAPSULE TWICE A DAY, Disp: 180 capsule, Rfl: 3    levothyroxine 112 mcg tablet, Take 1 tablet daily Monday through Friday and 1/2 tablet on Saturday and Sunday, Disp: 100 tablet, Rfl: 3    losartan-hydrochlorothiazide (HYZAAR) 100-12 5 MG per tablet, TAKE 1 TABLET DAILY, Disp: 90 tablet, Rfl: 3    meclizine (ANTIVERT) 12 5 MG tablet, Take 1 tablet (12 5 mg total) by mouth every 8 (eight) hours as needed for dizziness (Patient not taking: Reported on 8/17/2021), Disp: 30 tablet, Rfl: 1    nystatin (MYCOSTATIN) powder, Apply topically twice daily as directed, Disp: 60 g, Rfl: 3    Omega-3 Fatty Acids (FISH OIL) 1200 MG CAPS, Take 1 capsule by mouth 2 (two) times a day, Disp: , Rfl:     Current Facility-Administered Medications:     acetaminophen (TYLENOL) tablet 650 mg, 650 mg, Oral, Q6H PRN, Charles Xiomara [place], DO    ibuprofen (MOTRIN) tablet 600 mg, 600 mg, Oral, Q6H PRN, Miguelangel Xiomara [place], DO    Current Allergies     Allergies as of 06/17/2022    (No Known Allergies)            The following portions of the patient's history were reviewed and updated as appropriate: allergies, current medications, past family history, past medical history, past social history, past surgical history and problem list      Past Medical History:   Diagnosis Date    Abnormal EKG     last assessed 4/29/16    Chest skin lesion     last assessed 5/6/16    Depression with anxiety     last assessed 7/23/12    Hearing loss     last assessed 7/23/12       Past Surgical History:   Procedure Laterality Date    COLONOSCOPY      fiberoptic 2012    CYSTOSCOPY      HYSTERECTOMY      age 39    LUMBAR LAMINECTOMY         Family History   Problem Relation Age of Onset    Coronary artery disease Mother  Hypertension Mother     Irritable bowel syndrome Mother     Lung cancer Father     Hypertension Father     Other Father         elevated cholesterol    Colon cancer Half-Brother     Colon cancer Family     Diabetes Family     Ovarian cancer Sister     No Known Problems Daughter     Diabetes Maternal Grandmother     Lymphoma Maternal Grandfather     No Known Problems Paternal Grandmother     No Known Problems Paternal Grandfather     Cervical cancer Sister     No Known Problems Son     No Known Problems Half-Brother          Medications have been verified  Objective   Pulse 90   Temp (!) 101 5 °F (38 6 °C)   Resp 18   Ht 5' 6" (1 676 m)   Wt 88 5 kg (195 lb)   SpO2 95%   BMI 31 47 kg/m²        Physical Exam     Physical Exam  Vitals and nursing note reviewed  Constitutional:       Appearance: Normal appearance  She is not ill-appearing  HENT:      Head: Normocephalic and atraumatic  Right Ear: Tympanic membrane, ear canal and external ear normal       Left Ear: Tympanic membrane, ear canal and external ear normal       Nose: Nose normal       Mouth/Throat:      Mouth: Mucous membranes are moist    Eyes:      Extraocular Movements: Extraocular movements intact  Pupils: Pupils are equal, round, and reactive to light  Cardiovascular:      Rate and Rhythm: Normal rate and regular rhythm  Pulses: Normal pulses  Pulmonary:      Effort: Pulmonary effort is normal       Breath sounds: Normal breath sounds  Musculoskeletal:      Cervical back: Normal range of motion  Skin:     General: Skin is warm and dry  Capillary Refill: Capillary refill takes less than 2 seconds  Neurological:      General: No focal deficit present  Mental Status: She is alert and oriented to person, place, and time     Psychiatric:         Mood and Affect: Mood normal          Behavior: Behavior normal

## 2022-08-08 DIAGNOSIS — E03.9 ACQUIRED HYPOTHYROIDISM: ICD-10-CM

## 2022-08-08 DIAGNOSIS — G62.9 PERIPHERAL POLYNEUROPATHY: ICD-10-CM

## 2022-08-08 RX ORDER — GABAPENTIN 300 MG/1
CAPSULE ORAL
Qty: 180 CAPSULE | Refills: 3 | Status: SHIPPED | OUTPATIENT
Start: 2022-08-08

## 2022-08-08 RX ORDER — LEVOTHYROXINE SODIUM 112 UG/1
TABLET ORAL
Qty: 90 TABLET | Refills: 3 | Status: SHIPPED | OUTPATIENT
Start: 2022-08-08

## 2022-12-06 LAB
ALBUMIN SERPL-MCNC: 4.3 G/DL (ref 3.6–5.1)
ALBUMIN/GLOB SERPL: 1.5 (CALC) (ref 1–2.5)
ALP SERPL-CCNC: 70 U/L (ref 37–153)
ALT SERPL-CCNC: 22 U/L (ref 6–29)
AST SERPL-CCNC: 22 U/L (ref 10–35)
BASOPHILS # BLD AUTO: 53 CELLS/UL (ref 0–200)
BASOPHILS NFR BLD AUTO: 0.6 %
BILIRUB SERPL-MCNC: 0.8 MG/DL (ref 0.2–1.2)
BUN SERPL-MCNC: 11 MG/DL (ref 7–25)
BUN/CREAT SERPL: ABNORMAL (CALC) (ref 6–22)
CALCIUM SERPL-MCNC: 9.4 MG/DL (ref 8.6–10.4)
CHLORIDE SERPL-SCNC: 100 MMOL/L (ref 98–110)
CHOLEST SERPL-MCNC: 217 MG/DL
CHOLEST/HDLC SERPL: 4.7 (CALC)
CO2 SERPL-SCNC: 32 MMOL/L (ref 20–32)
CREAT SERPL-MCNC: 0.67 MG/DL (ref 0.6–1)
EOSINOPHIL # BLD AUTO: 178 CELLS/UL (ref 15–500)
EOSINOPHIL NFR BLD AUTO: 2 %
ERYTHROCYTE [DISTWIDTH] IN BLOOD BY AUTOMATED COUNT: 13.1 % (ref 11–15)
GFR/BSA.PRED SERPLBLD CYS-BASED-ARV: 93 ML/MIN/1.73M2
GLOBULIN SER CALC-MCNC: 2.8 G/DL (CALC) (ref 1.9–3.7)
GLUCOSE SERPL-MCNC: 114 MG/DL (ref 65–99)
HBA1C MFR BLD: 5.5 % OF TOTAL HGB
HCT VFR BLD AUTO: 39.8 % (ref 35–45)
HDLC SERPL-MCNC: 46 MG/DL
HGB BLD-MCNC: 13.7 G/DL (ref 11.7–15.5)
LDLC SERPL CALC-MCNC: 141 MG/DL (CALC)
LYMPHOCYTES # BLD AUTO: 1584 CELLS/UL (ref 850–3900)
LYMPHOCYTES NFR BLD AUTO: 17.8 %
MCH RBC QN AUTO: 29.4 PG (ref 27–33)
MCHC RBC AUTO-ENTMCNC: 34.4 G/DL (ref 32–36)
MCV RBC AUTO: 85.4 FL (ref 80–100)
MONOCYTES # BLD AUTO: 587 CELLS/UL (ref 200–950)
MONOCYTES NFR BLD AUTO: 6.6 %
NEUTROPHILS # BLD AUTO: 6497 CELLS/UL (ref 1500–7800)
NEUTROPHILS NFR BLD AUTO: 73 %
NONHDLC SERPL-MCNC: 171 MG/DL (CALC)
PLATELET # BLD AUTO: 355 THOUSAND/UL (ref 140–400)
PMV BLD REES-ECKER: 9.8 FL (ref 7.5–12.5)
POTASSIUM SERPL-SCNC: 3.6 MMOL/L (ref 3.5–5.3)
PROT SERPL-MCNC: 7.1 G/DL (ref 6.1–8.1)
RBC # BLD AUTO: 4.66 MILLION/UL (ref 3.8–5.1)
SODIUM SERPL-SCNC: 141 MMOL/L (ref 135–146)
TRIGL SERPL-MCNC: 161 MG/DL
TSH SERPL-ACNC: 1.02 MIU/L (ref 0.4–4.5)
WBC # BLD AUTO: 8.9 THOUSAND/UL (ref 3.8–10.8)

## 2022-12-14 ENCOUNTER — OFFICE VISIT (OUTPATIENT)
Dept: FAMILY MEDICINE CLINIC | Facility: CLINIC | Age: 72
End: 2022-12-14

## 2022-12-14 VITALS
SYSTOLIC BLOOD PRESSURE: 144 MMHG | TEMPERATURE: 98.1 F | BODY MASS INDEX: 31.34 KG/M2 | DIASTOLIC BLOOD PRESSURE: 86 MMHG | HEART RATE: 78 BPM | HEIGHT: 66 IN | WEIGHT: 195 LBS | RESPIRATION RATE: 16 BRPM | OXYGEN SATURATION: 98 %

## 2022-12-14 DIAGNOSIS — G62.9 PERIPHERAL POLYNEUROPATHY: ICD-10-CM

## 2022-12-14 DIAGNOSIS — J45.20 MILD INTERMITTENT ASTHMA WITHOUT COMPLICATION: ICD-10-CM

## 2022-12-14 DIAGNOSIS — J30.1 SEASONAL ALLERGIC RHINITIS DUE TO POLLEN: ICD-10-CM

## 2022-12-14 DIAGNOSIS — R73.01 IMPAIRED FASTING GLUCOSE: ICD-10-CM

## 2022-12-14 DIAGNOSIS — E78.2 MIXED HYPERLIPIDEMIA: ICD-10-CM

## 2022-12-14 DIAGNOSIS — Z12.31 ENCOUNTER FOR SCREENING MAMMOGRAM FOR MALIGNANT NEOPLASM OF BREAST: ICD-10-CM

## 2022-12-14 DIAGNOSIS — I10 PRIMARY HYPERTENSION: Primary | ICD-10-CM

## 2022-12-14 DIAGNOSIS — E03.9 ACQUIRED HYPOTHYROIDISM: ICD-10-CM

## 2022-12-14 DIAGNOSIS — E66.9 OBESITY (BMI 30.0-34.9): ICD-10-CM

## 2022-12-14 DIAGNOSIS — J20.9 ACUTE BRONCHITIS WITH BRONCHOSPASM: ICD-10-CM

## 2022-12-14 PROBLEM — R10.31 RIGHT GROIN PAIN: Status: RESOLVED | Noted: 2018-03-23 | Resolved: 2022-12-14

## 2022-12-14 PROBLEM — T63.441A BEE STING: Status: RESOLVED | Noted: 2021-08-17 | Resolved: 2022-12-14

## 2022-12-14 RX ORDER — AZITHROMYCIN 250 MG/1
TABLET, FILM COATED ORAL
Qty: 6 TABLET | Refills: 0 | Status: SHIPPED | OUTPATIENT
Start: 2022-12-14 | End: 2022-12-18

## 2022-12-14 RX ORDER — ALBUTEROL SULFATE 90 UG/1
2 AEROSOL, METERED RESPIRATORY (INHALATION) EVERY 4 HOURS PRN
Qty: 18 G | Refills: 1 | Status: SHIPPED | OUTPATIENT
Start: 2022-12-14

## 2022-12-14 RX ORDER — ROSUVASTATIN CALCIUM 5 MG/1
5 TABLET, COATED ORAL DAILY
Qty: 30 TABLET | Refills: 5 | Status: SHIPPED | OUTPATIENT
Start: 2022-12-14 | End: 2022-12-15

## 2022-12-15 DIAGNOSIS — E78.2 MIXED HYPERLIPIDEMIA: ICD-10-CM

## 2022-12-15 RX ORDER — ROSUVASTATIN CALCIUM 5 MG/1
TABLET, COATED ORAL
Qty: 90 TABLET | Refills: 5 | Status: SHIPPED | OUTPATIENT
Start: 2022-12-15

## 2022-12-15 NOTE — ASSESSMENT & PLAN NOTE
BP goal 130/80  Continue Losartan/HCTZ 100/12 5 mg daily  Recommended to follow a low-sodium diet, avoid processed foods  Work on weight reduction

## 2022-12-15 NOTE — ASSESSMENT & PLAN NOTE
Start Zithromax for 5 days  Use Ventolin HFA inhaler 2 puffs every 4-6 hours as needed for chest tightness or wheezing  Call office if symptoms persist or worsen

## 2022-12-15 NOTE — ASSESSMENT & PLAN NOTE
Hyperlipidemia- uncontrolled  ASCVD score 19 9 %  Discussed starting on statin therapy  Will start Rosuvastatin 5 mg 1 tablet daily  Reviewed side effects  Advised to follow a low-cholesterol, low-fat diet  Take omega-3 1000 mg 1 capsule twice daily  Recheck CMP, lipid panel in 3 months

## 2022-12-22 ENCOUNTER — APPOINTMENT (EMERGENCY)
Dept: RADIOLOGY | Facility: HOSPITAL | Age: 72
End: 2022-12-22

## 2022-12-22 ENCOUNTER — HOSPITAL ENCOUNTER (EMERGENCY)
Facility: HOSPITAL | Age: 72
Discharge: HOME | End: 2022-12-22
Attending: EMERGENCY MEDICINE

## 2022-12-22 VITALS
RESPIRATION RATE: 18 BRPM | DIASTOLIC BLOOD PRESSURE: 97 MMHG | SYSTOLIC BLOOD PRESSURE: 179 MMHG | TEMPERATURE: 97.5 F | HEART RATE: 91 BPM | OXYGEN SATURATION: 97 %

## 2022-12-22 DIAGNOSIS — R07.81 RIB PAIN ON LEFT SIDE: Primary | ICD-10-CM

## 2022-12-22 RX ORDER — LIDOCAINE 50 MG/G
1 PATCH TOPICAL ONCE
Status: DISCONTINUED | OUTPATIENT
Start: 2022-12-22 | End: 2022-12-22 | Stop reason: HOSPADM

## 2022-12-22 RX ORDER — ACETAMINOPHEN 325 MG/1
650 TABLET ORAL ONCE
Status: COMPLETED | OUTPATIENT
Start: 2022-12-22 | End: 2022-12-22

## 2022-12-22 RX ORDER — KETOROLAC TROMETHAMINE 30 MG/ML
15 INJECTION, SOLUTION INTRAMUSCULAR; INTRAVENOUS ONCE
Status: COMPLETED | OUTPATIENT
Start: 2022-12-22 | End: 2022-12-22

## 2022-12-22 RX ORDER — NAPROXEN 500 MG/1
500 TABLET ORAL 2 TIMES DAILY WITH MEALS
Qty: 30 TABLET | Refills: 0 | Status: SHIPPED | OUTPATIENT
Start: 2022-12-22

## 2022-12-22 RX ADMIN — LIDOCAINE 1 PATCH: 50 PATCH TOPICAL at 13:04

## 2022-12-22 RX ADMIN — KETOROLAC TROMETHAMINE 15 MG: 30 INJECTION, SOLUTION INTRAMUSCULAR; INTRAVENOUS at 13:06

## 2022-12-22 RX ADMIN — ACETAMINOPHEN 650 MG: 325 TABLET, FILM COATED ORAL at 13:06

## 2022-12-22 NOTE — ED PROVIDER NOTES
Chief Complaint   Patient presents with   • Rib Pain     Pt fell down last step today 0700 and landed on L side, c/o L sided rib pain worse with movement, denies headstrike,      History of Present Illness   This is a 67 y o  female PMH significant for hypertension, hyperlipidemia coming in today with complaint of fall  She reports at 7 AM she experienced a mechanical fall, slipping on the last step and falling onto her left side  She experienced a jerking motion, and notes left-sided rib pain, worse with breathing and movement, no alleviating factors  She denies any other traumatic injuries, denies any headache, vision changes, chest pain, abdominal pain, lacerations, bleeding, or bruising  She has remained ambulatory  Denies any presyncopal feelings, loss of consciousness, head strike, she is not on aspirin or other hematologic therapy  Denies any intoxication prior to this fall  No other symptoms currently  No other complaints at this time     - No language barrier    - History obtained from patient and chart   - Reviewed relevant past medical/family/social history  - There are no limitations to the history obtained  Past Medical, Past Surgical History:    has a past medical history of Abnormal EKG, Chest skin lesion, Depression with anxiety, and Hearing loss  has a past surgical history that includes Colonoscopy; Cystoscopy; Lumbar laminectomy; and Hysterectomy  Allergies:   No Known Allergies    Social and Family History:     Social History     Substance and Sexual Activity   Alcohol Use Yes    Comment: 3 glasses wine/week     Social History     Tobacco Use   Smoking Status Former   Smokeless Tobacco Never     Social History     Substance and Sexual Activity   Drug Use Never       Review of Systems:   Review of Systems   Constitutional: Negative for chills and fever  HENT: Negative for ear pain and sore throat  Eyes: Negative for pain and visual disturbance     Respiratory: Negative for cough and shortness of breath  Cardiovascular: Negative for chest pain and palpitations  Gastrointestinal: Negative for abdominal pain and vomiting  Genitourinary: Negative for dysuria and hematuria  Musculoskeletal: Negative for arthralgias and back pain  Rib pain   Skin: Negative for color change and rash  Neurological: Negative for seizures and syncope  All other systems reviewed and are negative  Physical Examination     Vitals:    12/22/22 1155   BP: (!) 179/97   BP Location: Left arm   Pulse: 91   Resp: 18   Temp: 97 5 °F (36 4 °C)   TempSrc: Oral   SpO2: 97%     Vitals reviewed by me  Physical Exam  Vitals and nursing note reviewed  Constitutional:       General: She is not in acute distress  Appearance: She is well-developed  HENT:      Head: Normocephalic and atraumatic  Eyes:      Conjunctiva/sclera: Conjunctivae normal    Cardiovascular:      Rate and Rhythm: Normal rate and regular rhythm  Heart sounds: No murmur heard  Pulmonary:      Effort: Pulmonary effort is normal  No respiratory distress  Breath sounds: Normal breath sounds  Abdominal:      Palpations: Abdomen is soft  Tenderness: There is no abdominal tenderness  There is no guarding or rebound  Musculoskeletal:         General: No swelling or deformity  Cervical back: Neck supple  Comments: L sided rib pain, tender to palpation, worse with deep breaths, no eccymoses or crepitus   Skin:     General: Skin is warm and dry  Capillary Refill: Capillary refill takes less than 2 seconds  Neurological:      Mental Status: She is alert     Psychiatric:         Mood and Affect: Mood normal             Risk Stratification Tools                Orders Placed This Encounter   Procedures   • CT chest without contrast       Labs:   Labs Reviewed - No data to display    Imaging:     CT chest without contrast   ED Interpretation by Deja Epps MD (12/22 4574)   No obvious rib fracture  Highly suspicious for LEFT lateral rib fracture, but I don't see one  Final Result by Loni Houston MD (12/22 1431)      1  No acute rib fracture  2   3 mm right upper lobe lung nodule with additional tiny apical nodules  Based on current Fleischner Society 2017 Guidelines on incidental pulmonary nodule, no routine follow-up is needed if the patient is low risk  If the patient is high risk,    optional follow-up chest CT at 12 months can be considered  Workstation performed: [id number]            CT chest without contrast   ED Interpretation   No obvious rib fracture  Highly suspicious for LEFT lateral rib fracture, but I don't see one  Final Result      1  No acute rib fracture  2   3 mm right upper lobe lung nodule with additional tiny apical nodules  Based on current Fleischner Society 2017 Guidelines on incidental pulmonary nodule, no routine follow-up is needed if the patient is low risk  If the patient is high risk,    optional follow-up chest CT at 12 months can be considered  Workstation performed: [id number]               Procedures   Procedures      MDM:   Jeff Hood is a 67 y o  who presents with complaints of rib pain    Vital signs are hypertension, otherwise within normal limits, physical exam shows the patient overall appears well, does exhibit left-sided rib tenderness, worse with breaths, lungs clear to auscultation bilaterally, no cardiac murmurs, no other evidence of injury in all 4 extremities and on secondary survey    Ddx: Rib fracture    Plan: CT chest, pain control    ED Course as of 12/22/22 2234   Thu Dec 22, 2022   1438 CT chest without contrast   1438 Pain well controlled at this time  Will send home with instructions for follow up       Summary: No evidence of acute rib fracture  Advised on outpatient follow-up for the remainder of her CT imaging findings  Pain well controlled prior to discharge  [place] safe to send home  Recommended close follow-up  Advised on strict return precautions  MDM  Number of Diagnoses or Management Options  Rib pain on left side: new, needed workup  Risk of Complications, Morbidity, and/or Mortality  Presenting problems: moderate  Diagnostic procedures: low  Management options: low      Final Dispo   DC    Final Diagnosis:  1  Rib pain on left side          Medications   ketorolac (TORADOL) injection 15 mg (15 mg Intramuscular Given 12/22/22 1306)   acetaminophen (TYLENOL) tablet 650 mg (650 mg Oral Given 12/22/22 1306)     Time reflects when diagnosis was documented in both MDM as applicable and the Disposition within this note     Time User Action Codes Description Comment    12/22/2022  2:39 PM Baldemar Mireille Add [R07 81] Rib pain on left side       ED Disposition     ED Disposition   Discharge    Condition   Stable    Date/Time   Thu Dec 22, 2022  2:40 PM    Comment   Melissa Campbell discharge to home/self care                 Follow-up Information     Follow up With Specialties Details Why Contact Info Additional Information    Anika Perez MD Family Medicine Call   [address] [facility] [phone]       [address] [place] [address] Emergency Department Emergency Medicine  If symptoms worsen [phone]  8 [address] Emergency Department, [address] [place] [address], [address]        Discharge Medication List as of 12/22/2022  2:40 PM      START taking these medications    Details   Diclofenac Sodium (VOLTAREN) 1 % Apply 2 g topically 4 (four) times a day, Starting Thu 12/22/2022, Normal      naproxen (Naprosyn) 500 mg tablet Take 1 tablet (500 mg total) by mouth 2 (two) times a day with meals, Starting Thu 12/22/2022, Normal         CONTINUE these medications which have NOT CHANGED    Details   albuterol (Ventolin HFA) 90 mcg/act inhaler Inhale 2 puffs every 4 (four) hours as needed for wheezing, Starting Wed 12/14/2022, Normal      ascorbic acid (VITAMIN C) 500 mg tablet Take by mouth, Historical Med      aspirin (ECOTRIN LOW STRENGTH) 81 mg EC tablet Take 1 tablet by mouth daily, Starting Sat 10/11/2014, Historical Med      Calcium Carbonate-Vitamin D (CALCIUM 600+D) 600-400 MG-UNIT per tablet Take 1 tablet by mouth daily, Starting Wed 7/9/2014, Historical Med      cyanocobalamin (VITAMIN B-12) 1,000 mcg tablet Take by mouth daily, Historical Med      fexofenadine (ALLEGRA) 180 MG tablet Take 180 mg by mouth daily, Historical Med      gabapentin (NEURONTIN) 300 mg capsule TAKE 1 CAPSULE TWICE A DAY, Normal      levothyroxine 112 mcg tablet TAKE 1 TABLET DAILY, Normal      losartan-hydrochlorothiazide (HYZAAR) 100-12 5 MG per tablet TAKE 1 TABLET DAILY, Normal      nystatin (MYCOSTATIN) powder Apply topically twice daily as directed, Normal      Omega-3 Fatty Acids (FISH OIL) 1200 MG CAPS Take 1 capsule by mouth 2 (two) times a day, Historical Med      rosuvastatin (CRESTOR) 5 mg tablet take 1 tablet by mouth once daily, Normal           No discharge procedures on file  All details of the evaluation and treatment plan were made clear and additionally all questions and concerns were addressed while under my care  Portions of the record may have been created with voice recognition software  Occasional wrong word or "sound a like" substitutions may have occurred due to the inherent limitations of voice recognition software  Read the chart carefully and recognize, using context, where substitutions have occurred  The attending physician physically available and evaluated the above patient alongside myself       Mary Yung MD  12/22/22 4660

## 2022-12-22 NOTE — DISCHARGE INSTRUCTIONS
Your workup here was not concerning for anything dangerous  Therefore there is no need for you to stay at the hospital for further testing  We feel safe to send you home  You can use Naprosyn, Voltaren for management of your symptoms  You should follow up with your primary physician to assess for resolution of your symptoms and to determine if there is further evaluation that needs to be performed      Return to the emergency department if you have any symptoms of worsening pain or shortness of breath

## 2022-12-22 NOTE — ED ATTENDING ATTESTATION
Final Diagnosis:  1  Rib pain on left side           IEverardo MD, saw and evaluated the patient  All available labs and X-rays were ordered by me or the resident and have been reviewed by myself  I discussed the patient with the resident / non-physician and agree with the resident's / non-physician practitioner's findings and plan as documented in the resident's / non-physician practicitioner's note, except where noted  At this point, I agree with the current assessment done in the ED  I was present during key portions of all procedures performed unless otherwise stated  Chief Complaint   Patient presents with   • Rib Pain     Pt fell down last step today 0700 and landed on L side, c/o L sided rib pain worse with movement, denies headstrike,      This is a 67 y o  female presenting for evaluation of fall  LEFT rib pain since then, pleuritic  Since the fall  She doesn't recall specifically hitting it  She landed on knee but no pain there  No AC/AP  +twisting motion  Worse with movement  PMH:  HTN   HLD   has a past medical history of Abnormal EKG, Chest skin lesion, Depression with anxiety, and Hearing loss  PSH:   has a past surgical history that includes Colonoscopy; Cystoscopy; Lumbar laminectomy; and Hysterectomy  Social:  Social History     Substance and Sexual Activity   Alcohol Use Yes    Comment: 3 glasses wine/week     Social History     Tobacco Use   Smoking Status Former   Smokeless Tobacco Never     Social History     Substance and Sexual Activity   Drug Use Never     PE:  Vitals:    12/22/22 1155   BP: (!) 179/97   BP Location: Left arm   Pulse: 91   Resp: 18   Temp: 97 5 °F (36 4 °C)   TempSrc: Oral   SpO2: 97%   General: VSS, NAD, awake, alert  Well-nourished, well-developed  Appears stated age  Head: Normocephalic, atraumatic, nontender  Eyes: PERRL, EOM-I  No diplopia  No hyphema  No subconjunctival hemorrhages  Symmetrical lids     ENTAtraumatic external nose and ears  MMM  No stridor  Normal phonation  No drooling  Base of mouth is soft  No mastoid tenderness  Neck: Symmetric, trachea midline  No JVD  CV: Peripheral pulses +2 throughout    +chest wall tenderness; very very reproducible of her symptoms  Movement worsens the pain  Lungs:   Unlabored   No retractions  No crepitus  No tachypnea  No paradoxical motion  Abd: +BS, soft, NT/ND  Psoas/obturator/heel strike signs are absent  MSK:   FROM   Back:   No CVAT  Skin: Dry, intact  Neuro: AAOx3, GCS 15, CN II-XII grossly intact  Motor grossly intact  Psychiatric/Behavioral: Appropriate mood and affect   Exam: deferred  A:  - LEFT rib tenderness  P:  - CT chest for rib fx      - 13 point ROS was performed and all are normal unless stated in the history above  - Nursing note reviewed  Vitals reviewed  - Orders placed by myself and/or advanced practitioner / resident     - Previous chart was reviewed  - No language barrier    - History obtained from patient  - There are no limitations to the history obtained  - Critical care time: Not applicable for this patient  Code Status: No Order  Advance Directive and Living Will:      Power of :    POLST:      Medications   lidocaine (LIDODERM) 5 % patch 1 patch (1 patch Topical Medication Applied 12/22/22 1304)   ketorolac (TORADOL) injection 15 mg (15 mg Intramuscular Given 12/22/22 1306)   acetaminophen (TYLENOL) tablet 650 mg (650 mg Oral Given 12/22/22 1306)     CT chest without contrast   ED Interpretation   No obvious rib fracture  Highly suspicious for LEFT lateral rib fracture, but I don't see one  Final Result      1  No acute rib fracture  2   3 mm right upper lobe lung nodule with additional tiny apical nodules  Based on current Fleischner Society 2017 Guidelines on incidental pulmonary nodule, no routine follow-up is needed if the patient is low risk    If the patient is high risk,    optional follow-up chest CT at 12 months can be considered  Workstation performed: [id number]           Orders Placed This Encounter   Procedures   • CT chest without contrast     Labs Reviewed - No data to display  Time reflects when diagnosis was documented in both MDM as applicable and the Disposition within this note     Time User Action Codes Description Comment    12/22/2022  2:39 PM Georgette Saleh Add [R07 81] Rib pain on left side       ED Disposition     ED Disposition   Discharge    Condition   Stable    Date/Time   Thu Dec 22, 2022  2:40 PM    Comment   Em Sheikh discharge to home/self care                 Follow-up Information     Follow up With Specialties Details Why Contact Info Additional Information    Maylin Arroyo MD Family Medicine Call   [address] [facility] [phone]       [address] [place] [address] Emergency Department Emergency Medicine  If symptoms worsen [address] [phone]  3 [facility] 64 [facility] Emergency Department, [address], [address]        Discharge Medication List as of 12/22/2022  2:40 PM      START taking these medications    Details   Diclofenac Sodium (VOLTAREN) 1 % Apply 2 g topically 4 (four) times a day, Starting Thu 12/22/2022, Normal      naproxen (Naprosyn) 500 mg tablet Take 1 tablet (500 mg total) by mouth 2 (two) times a day with meals, Starting Thu 12/22/2022, Normal         CONTINUE these medications which have NOT CHANGED    Details   albuterol (Ventolin HFA) 90 mcg/act inhaler Inhale 2 puffs every 4 (four) hours as needed for wheezing, Starting Wed 12/14/2022, Normal      ascorbic acid (VITAMIN C) 500 mg tablet Take by mouth, Historical Med      aspirin (ECOTRIN LOW STRENGTH) 81 mg EC tablet Take 1 tablet by mouth daily, Starting Sat 10/11/2014, Historical Med      Calcium Carbonate-Vitamin D (CALCIUM 600+D) 600-400 MG-UNIT per tablet Take 1 tablet by mouth daily, Starting Wed 7/9/2014, Historical Med      cyanocobalamin (VITAMIN B-12) 1,000 mcg tablet Take by mouth daily, Historical Med      fexofenadine (ALLEGRA) 180 MG tablet Take 180 mg by mouth daily, Historical Med      gabapentin (NEURONTIN) 300 mg capsule TAKE 1 CAPSULE TWICE A DAY, Normal      levothyroxine 112 mcg tablet TAKE 1 TABLET DAILY, Normal      losartan-hydrochlorothiazide (HYZAAR) 100-12 5 MG per tablet TAKE 1 TABLET DAILY, Normal      nystatin (MYCOSTATIN) powder Apply topically twice daily as directed, Normal      Omega-3 Fatty Acids (FISH OIL) 1200 MG CAPS Take 1 capsule by mouth 2 (two) times a day, Historical Med      rosuvastatin (CRESTOR) 5 mg tablet take 1 tablet by mouth once daily, Normal           No discharge procedures on file  Prior to Admission Medications   Prescriptions Last Dose Informant Patient Reported? Taking?    Calcium Carbonate-Vitamin D (CALCIUM 600+D) 600-400 MG-UNIT per tablet   Yes No   Sig: Take 1 tablet by mouth daily   Omega-3 Fatty Acids (FISH OIL) 1200 MG CAPS   Yes No   Sig: Take 1 capsule by mouth 2 (two) times a day   albuterol (Ventolin HFA) 90 mcg/act inhaler   No No   Sig: Inhale 2 puffs every 4 (four) hours as needed for wheezing   ascorbic acid (VITAMIN C) 500 mg tablet   Yes No   Sig: Take by mouth   aspirin (ECOTRIN LOW STRENGTH) 81 mg EC tablet   Yes No   Sig: Take 1 tablet by mouth daily   cyanocobalamin (VITAMIN B-12) 1,000 mcg tablet   Yes No   Sig: Take by mouth daily   fexofenadine (ALLEGRA) 180 MG tablet   Yes No   Sig: Take 180 mg by mouth daily   Patient not taking: Reported on 6/17/2022   gabapentin (NEURONTIN) 300 mg capsule   No No   Sig: TAKE 1 CAPSULE TWICE A DAY   levothyroxine 112 mcg tablet   No No   Sig: TAKE 1 TABLET DAILY   losartan-hydrochlorothiazide (HYZAAR) 100-12 5 MG per tablet   No No   Sig: TAKE 1 TABLET DAILY   nystatin (MYCOSTATIN) powder   No No   Sig: Apply topically twice daily as directed rosuvastatin (CRESTOR) 5 mg tablet   No No   Sig: take 1 tablet by mouth once daily      Facility-Administered Medications Last Administration Doses Remaining   acetaminophen (TYLENOL) tablet 650 mg 6/17/2022 10:26 AM    ibuprofen (MOTRIN) tablet 600 mg 6/17/2022 10:27 AM           Portions of the record may have been created with voice recognition software  Occasional wrong word or "sound a like" substitutions may have occurred due to the inherent limitations of voice recognition software  Read the chart carefully and recognize, using context, where substitutions have occurred      Electronically signed by:  Adi Mehta

## 2023-01-23 NOTE — PROGRESS NOTES
Controlled on Lexapro, denies SI/HI Name: Vikash Boswell      : 1950      MRN: [id number]  Encounter Provider: Claudia Begum MD  Encounter Date: 2022   Encounter department: [address]    No chief complaint on file  Health Maintenance   Topic Date Due   • Hepatitis B Vaccine (1 of 3 - 3-dose series) Never done   • COVID-19 Vaccine (4 - Booster for Pfizer series) 2022   • Influenza Vaccine (1) 2022   • Breast Cancer Screening: Mammogram  2022   • Depression Screening  2023   • BMI: Followup Plan  2023   • Fall Risk  2023   • Urinary Incontinence Screening  2023   • Medicare Annual Wellness Visit (AWV)  2023   • BMI: Adult  2023   • Colorectal Cancer Screening  2026   • Hepatitis C Screening  Completed   • Osteoporosis Screening  Completed   • Pneumococcal Vaccine: 65+ Years  Completed   • HIB Vaccine  Aged Out   • IPV Vaccine  Aged Out   • Hepatitis A Vaccine  Aged Out   • Meningococcal ACWY Vaccine  Aged Out   • HPV Vaccine  Aged Out         Assessment & Plan     1  Primary hypertension  Assessment & Plan:  BP goal 130/80  Continue Losartan/HCTZ 100/12 5 mg daily  Recommended to follow a low-sodium diet, avoid processed foods  Work on weight reduction  Orders:  -     Comprehensive metabolic panel; Future  -     Comprehensive metabolic panel; Future; Expected date: 2023    2  Acquired hypothyroidism  Assessment & Plan:  TSH 1 02  Continue Levothyroxine 112 mcg daily  Orders:  -     TSH, 3rd generation with Free T4 reflex; Future    3  Mixed hyperlipidemia  Assessment & Plan:  Hyperlipidemia- uncontrolled  ASCVD score 19 9 %  Discussed starting on statin therapy  Will start Rosuvastatin 5 mg 1 tablet daily  Reviewed side effects  Advised to follow a low-cholesterol, low-fat diet  Take omega-3 1000 mg 1 capsule twice daily  Recheck CMP, lipid panel in 3 months      Orders:  -     Comprehensive metabolic panel; Future  -     Lipid panel; Future  -     rosuvastatin (CRESTOR) 5 mg tablet; Take 1 tablet (5 mg total) by mouth daily  -     Comprehensive metabolic panel; Future; Expected date: 03/14/2023  -     Lipid panel; Future; Expected date: 03/14/2023    4  Impaired fasting glucose  Assessment & Plan:  Recommended to follow a low-carb diet, regular exercise  Orders:  -     Comprehensive metabolic panel; Future    5  Obesity (BMI 30 0-34  9)  Assessment & Plan:  Encouraged to work on dietary and lifestyle modifications, losing weight  6  Mild intermittent asthma without complication  Assessment & Plan:  Use Ventolin HFA inhaler PRN  7  Seasonal allergic rhinitis due to pollen  Assessment & Plan:  Take Fexofenadine 180 mg daily PRN during allergy seasons  8  Acute bronchitis with bronchospasm  Assessment & Plan:  Start Zithromax for 5 days  Use Ventolin HFA inhaler 2 puffs every 4-6 hours as needed for chest tightness or wheezing  Call office if symptoms persist or worsen  Orders:  -     azithromycin (ZITHROMAX) 250 mg tablet; Take 2 tablets today then 1 tablet daily x 4 days  -     albuterol (Ventolin HFA) 90 mcg/act inhaler; Inhale 2 puffs every 4 (four) hours as needed for wheezing    9  Peripheral polyneuropathy  Assessment & Plan:  Continue Gabapentin 300 mg twice daily  Take Vit B12 1000 mcg daily  10  Encounter for screening mammogram for malignant neoplasm of breast  -     Mammo screening bilateral w 3d & cad; Future; Expected date: 12/21/2022      Schedule follow-up officevisit, Medicare AWV in 6 months  Subjective      HPI     Patient is a peasant 77-year-old female with PMHx of HTN, IFG, Obesity, Hypothyroidism, Hyperlipidemia, GERD, AR, Asthma, peripheral neuropathy, OA      She presents for 6-month follow-up office visit  Reviewed current medications, blood test results from 12/6/22  Fasting blood sugar 114, creatinine 0 67, potassium 3 6, TSH 1 02,Hb A1C  5 5  Cholesterol 217, HDL 46,   HTN - denies chest pain, shortness of breath, dizziness  Currently taking Losartan/HCTZ 100/12 5 mg daily  Hyperlipidemia - patient admits to dietary indiscretion  She stopped taking Red yeast rice  Takes Omega-3  Declined starting on statin therapy in the past     Family history is positive for stroke in her mother  Patient c/o productive cough, chest congestion, wheezing for the past 2 weeks  She was seen at urgent care center, tested negative for Flu and COVID  Reports no fever or chills  Denies tobacco use  Peripheral neuropathy- symptoms are stable  Patient takes Gabapentin 300 mg twice daily  Last mammogram done in December 2020  DEXA scan done in February 2021 showed normal bone mineral density  Patient takes calcium and vitamin D supplements, walks  Family history is positive for colon CA in her half brother  Colonoscopy done by colorectal surgeon Dr Kenneth Fuller in December 2021, 1 polyp was removed  Dr Kenneth Fuller recommended to repeat colonoscopy in 5 years  Patient received COVID booster on October 26, 2022 at [facility]  Assessment/Plan:    Problem List Items Addressed This Visit        Endocrine    Hypothyroidism     TSH 1 02  Continue Levothyroxine 112 mcg daily  Relevant Orders    TSH, 3rd generation with Free T4 reflex    Impaired fasting glucose     Recommended to follow a low-carb diet, regular exercise  Relevant Orders    Comprehensive metabolic panel       Respiratory    Allergic rhinitis     Take Fexofenadine 180 mg daily PRN during allergy seasons  Mild intermittent asthma without complication     Use Ventolin HFA inhaler PRN  Relevant Medications    albuterol (Ventolin HFA) 90 mcg/act inhaler    Acute bronchitis with bronchospasm     Start Zithromax for 5 days  Use Ventolin HFA inhaler 2 puffs every 4-6 hours as needed for chest tightness or wheezing    Call office if symptoms persist or worsen  Relevant Medications    azithromycin (ZITHROMAX) 250 mg tablet    albuterol (Ventolin HFA) 90 mcg/act inhaler       Cardiovascular and Mediastinum    Hypertension - Primary     BP goal 130/80  Continue Losartan/HCTZ 100/12 5 mg daily  Recommended to follow a low-sodium diet, avoid processed foods  Work on weight reduction  Relevant Orders    Comprehensive metabolic panel    Comprehensive metabolic panel       Nervous and Auditory    Peripheral neuropathy     Continue Gabapentin 300 mg twice daily  Take Vit B12 1000 mcg daily  Other    Hyperlipidemia     Hyperlipidemia- uncontrolled  ASCVD score 19 9 %  Discussed starting on statin therapy  Will start Rosuvastatin 5 mg 1 tablet daily  Reviewed side effects  Advised to follow a low-cholesterol, low-fat diet  Take omega-3 1000 mg 1 capsule twice daily  Recheck CMP, lipid panel in 3 months  Relevant Medications    rosuvastatin (CRESTOR) 5 mg tablet    Other Relevant Orders    Comprehensive metabolic panel    Lipid panel    Comprehensive metabolic panel    Lipid panel    Obesity (BMI 30 0-34  9)     Encouraged to work on dietary and lifestyle modifications, losing weight  Other Visit Diagnoses     Encounter for screening mammogram for malignant neoplasm of breast        Relevant Orders    Mammo screening bilateral w 3d & cad          Recent Visits  No visits were found meeting these conditions  Showing recent visits within past 7 days and meeting all other requirements  Today's Visits  Date Type Provider Dept   12/14/22 Office Visit Quintin Montez MD [address] [place] [address] today's visits and meeting all other requirements  Future Appointments  No visits were found meeting these conditions  Showing future appointments within next 150 days and meeting all other requirements     Review of Systems   Constitutional: Positive for fatigue   Negative for activity change, appetite change, chills and fever (mild)  HENT: Positive for congestion  Negative for ear pain, hearing loss, nosebleeds, sore throat and trouble swallowing  Eyes: Negative  Respiratory: Positive for cough, chest tightness and wheezing  Negative for shortness of breath  Cardiovascular: Negative for chest pain, palpitations and leg swelling  Gastrointestinal: Positive for constipation (occasional)  Negative for abdominal pain, blood in stool, diarrhea, nausea and vomiting  Genitourinary: Negative for difficulty urinating, dysuria and hematuria  Musculoskeletal: Positive for arthralgias  Negative for back pain, gait problem and joint swelling  Skin: Negative for rash  Neurological: Positive for numbness (in feet)  Negative for dizziness, syncope and headaches  Hematological: Negative  Psychiatric/Behavioral: Negative for dysphoric mood and sleep disturbance  The patient is not nervous/anxious          Current Outpatient Medications on File Prior to Visit   Medication Sig   • ascorbic acid (VITAMIN C) 500 mg tablet Take by mouth   • aspirin (ECOTRIN LOW STRENGTH) 81 mg EC tablet Take 1 tablet by mouth daily   • Calcium Carbonate-Vitamin D (CALCIUM 600+D) 600-400 MG-UNIT per tablet Take 1 tablet by mouth daily   • cyanocobalamin (VITAMIN B-12) 1,000 mcg tablet Take by mouth daily   • fexofenadine (ALLEGRA) 180 MG tablet Take 180 mg by mouth daily (Patient not taking: Reported on 6/17/2022)   • gabapentin (NEURONTIN) 300 mg capsule TAKE 1 CAPSULE TWICE A DAY   • levothyroxine 112 mcg tablet TAKE 1 TABLET DAILY   • losartan-hydrochlorothiazide (HYZAAR) 100-12 5 MG per tablet TAKE 1 TABLET DAILY   • nystatin (MYCOSTATIN) powder Apply topically twice daily as directed   • Omega-3 Fatty Acids (FISH OIL) 1200 MG CAPS Take 1 capsule by mouth 2 (two) times a day   • [DISCONTINUED] meclizine (ANTIVERT) 12 5 MG tablet Take 1 tablet (12 5 mg total) by mouth every 8 (eight) hours as needed for dizziness (Patient not taking: Reported on 8/17/2021)       Objective     /86 (BP Location: Left arm, Patient Position: Sitting, Cuff Size: Standard)   Pulse 78   Temp 98 1 °F (36 7 °C) (Tympanic)   Resp 16   Ht 5' 6" (1 676 m)   Wt 88 5 kg (195 lb)   SpO2 98%   BMI 31 47 kg/m²     Physical Exam  Vitals and nursing note reviewed  Constitutional:       Appearance: Normal appearance  She is obese  She is not toxic-appearing  HENT:      Head: Normocephalic and atraumatic  Nose: Congestion (mild) present  Eyes:      Conjunctiva/sclera: Conjunctivae normal       Pupils: Pupils are equal, round, and reactive to light  Neck:      Vascular: No carotid bruit  Cardiovascular:      Rate and Rhythm: Normal rate and regular rhythm  Heart sounds: No murmur heard  Pulmonary:      Effort: Pulmonary effort is normal  No respiratory distress  Breath sounds: Wheezing and rales present  Abdominal:      General: There is no distension  Palpations: Abdomen is soft  Tenderness: There is no abdominal tenderness  Musculoskeletal:         General: No swelling, tenderness or deformity  Normal range of motion  Cervical back: Normal range of motion and neck supple  Right lower leg: No edema  Left lower leg: No edema  Skin:     General: Skin is warm and dry  Findings: No rash  Neurological:      General: No focal deficit present  Mental Status: She is alert     Psychiatric:         Mood and Affect: Mood normal        Marisol Kelly MD

## 2023-01-26 DIAGNOSIS — I10 ESSENTIAL HYPERTENSION: ICD-10-CM

## 2023-01-26 RX ORDER — LOSARTAN POTASSIUM AND HYDROCHLOROTHIAZIDE 12.5; 1 MG/1; MG/1
TABLET ORAL
Qty: 90 TABLET | Refills: 3 | Status: SHIPPED | OUTPATIENT
Start: 2023-01-26

## 2023-03-16 LAB
ALBUMIN SERPL-MCNC: 4.5 G/DL (ref 3.6–5.1)
ALBUMIN/GLOB SERPL: 2 (CALC) (ref 1–2.5)
ALP SERPL-CCNC: 66 U/L (ref 37–153)
ALT SERPL-CCNC: 25 U/L (ref 6–29)
AST SERPL-CCNC: 23 U/L (ref 10–35)
BILIRUB SERPL-MCNC: 0.7 MG/DL (ref 0.2–1.2)
BUN SERPL-MCNC: 15 MG/DL (ref 7–25)
BUN/CREAT SERPL: ABNORMAL (CALC) (ref 6–22)
CALCIUM SERPL-MCNC: 9.4 MG/DL (ref 8.6–10.4)
CHLORIDE SERPL-SCNC: 102 MMOL/L (ref 98–110)
CHOLEST SERPL-MCNC: 157 MG/DL
CHOLEST/HDLC SERPL: 2.6 (CALC)
CO2 SERPL-SCNC: 31 MMOL/L (ref 20–32)
CREAT SERPL-MCNC: 0.76 MG/DL (ref 0.6–1)
GFR/BSA.PRED SERPLBLD CYS-BASED-ARV: 83 ML/MIN/1.73M2
GLOBULIN SER CALC-MCNC: 2.2 G/DL (CALC) (ref 1.9–3.7)
GLUCOSE SERPL-MCNC: 111 MG/DL (ref 65–99)
HDLC SERPL-MCNC: 60 MG/DL
LDLC SERPL CALC-MCNC: 74 MG/DL (CALC)
NONHDLC SERPL-MCNC: 97 MG/DL (CALC)
POTASSIUM SERPL-SCNC: 4.2 MMOL/L (ref 3.5–5.3)
PROT SERPL-MCNC: 6.7 G/DL (ref 6.1–8.1)
SODIUM SERPL-SCNC: 140 MMOL/L (ref 135–146)
TRIGL SERPL-MCNC: 157 MG/DL

## 2023-06-11 PROBLEM — J20.9 ACUTE BRONCHITIS WITH BRONCHOSPASM: Status: RESOLVED | Noted: 2022-12-14 | Resolved: 2023-06-11

## 2023-06-12 ENCOUNTER — APPOINTMENT (OUTPATIENT)
Dept: LAB | Facility: CLINIC | Age: 73
End: 2023-06-12
Payer: COMMERCIAL

## 2023-06-12 DIAGNOSIS — I10 PRIMARY HYPERTENSION: ICD-10-CM

## 2023-06-12 DIAGNOSIS — E03.9 ACQUIRED HYPOTHYROIDISM: ICD-10-CM

## 2023-06-12 DIAGNOSIS — E78.2 MIXED HYPERLIPIDEMIA: ICD-10-CM

## 2023-06-12 DIAGNOSIS — R73.01 IMPAIRED FASTING GLUCOSE: ICD-10-CM

## 2023-06-12 LAB
ALBUMIN SERPL BCP-MCNC: 3.5 G/DL (ref 3.5–5)
ALP SERPL-CCNC: 76 U/L (ref 46–116)
ALT SERPL W P-5'-P-CCNC: 28 U/L (ref 12–78)
ANION GAP SERPL CALCULATED.3IONS-SCNC: 1 MMOL/L (ref 4–13)
AST SERPL W P-5'-P-CCNC: 14 U/L (ref 5–45)
BILIRUB SERPL-MCNC: 0.55 MG/DL (ref 0.2–1)
BUN SERPL-MCNC: 11 MG/DL (ref 5–25)
CALCIUM SERPL-MCNC: 8.8 MG/DL (ref 8.3–10.1)
CHLORIDE SERPL-SCNC: 105 MMOL/L (ref 96–108)
CHOLEST SERPL-MCNC: 138 MG/DL
CO2 SERPL-SCNC: 32 MMOL/L (ref 21–32)
CREAT SERPL-MCNC: 0.78 MG/DL (ref 0.6–1.3)
GFR SERPL CREATININE-BSD FRML MDRD: 76 ML/MIN/1.73SQ M
GLUCOSE P FAST SERPL-MCNC: 116 MG/DL (ref 65–99)
HDLC SERPL-MCNC: 59 MG/DL
LDLC SERPL CALC-MCNC: 60 MG/DL (ref 0–100)
NONHDLC SERPL-MCNC: 79 MG/DL
POTASSIUM SERPL-SCNC: 3.3 MMOL/L (ref 3.5–5.3)
PROT SERPL-MCNC: 7.1 G/DL (ref 6.4–8.4)
SODIUM SERPL-SCNC: 138 MMOL/L (ref 135–147)
TRIGL SERPL-MCNC: 95 MG/DL
TSH SERPL DL<=0.05 MIU/L-ACNC: 1.42 UIU/ML (ref 0.45–4.5)

## 2023-06-12 PROCEDURE — 36415 COLL VENOUS BLD VENIPUNCTURE: CPT

## 2023-06-12 PROCEDURE — 80061 LIPID PANEL: CPT

## 2023-06-12 PROCEDURE — 84443 ASSAY THYROID STIM HORMONE: CPT

## 2023-06-12 PROCEDURE — 80053 COMPREHEN METABOLIC PANEL: CPT

## 2023-06-14 ENCOUNTER — OFFICE VISIT (OUTPATIENT)
Dept: FAMILY MEDICINE CLINIC | Facility: CLINIC | Age: 73
End: 2023-06-14
Payer: COMMERCIAL

## 2023-06-14 VITALS
RESPIRATION RATE: 12 BRPM | BODY MASS INDEX: 32.24 KG/M2 | DIASTOLIC BLOOD PRESSURE: 82 MMHG | OXYGEN SATURATION: 95 % | WEIGHT: 200.6 LBS | TEMPERATURE: 97.2 F | HEART RATE: 62 BPM | SYSTOLIC BLOOD PRESSURE: 122 MMHG | HEIGHT: 66 IN

## 2023-06-14 DIAGNOSIS — G62.9 PERIPHERAL POLYNEUROPATHY: ICD-10-CM

## 2023-06-14 DIAGNOSIS — E87.6 HYPOKALEMIA: ICD-10-CM

## 2023-06-14 DIAGNOSIS — J45.20 MILD INTERMITTENT ASTHMA WITHOUT COMPLICATION: ICD-10-CM

## 2023-06-14 DIAGNOSIS — Z00.00 MEDICARE ANNUAL WELLNESS VISIT, SUBSEQUENT: ICD-10-CM

## 2023-06-14 DIAGNOSIS — E03.9 ACQUIRED HYPOTHYROIDISM: ICD-10-CM

## 2023-06-14 DIAGNOSIS — E66.9 OBESITY (BMI 30.0-34.9): ICD-10-CM

## 2023-06-14 DIAGNOSIS — J01.00 ACUTE NON-RECURRENT MAXILLARY SINUSITIS: ICD-10-CM

## 2023-06-14 DIAGNOSIS — I10 PRIMARY HYPERTENSION: Primary | ICD-10-CM

## 2023-06-14 DIAGNOSIS — E78.2 MIXED HYPERLIPIDEMIA: ICD-10-CM

## 2023-06-14 DIAGNOSIS — J30.1 SEASONAL ALLERGIC RHINITIS DUE TO POLLEN: ICD-10-CM

## 2023-06-14 PROCEDURE — 99215 OFFICE O/P EST HI 40 MIN: CPT | Performed by: FAMILY MEDICINE

## 2023-06-14 PROCEDURE — G0439 PPPS, SUBSEQ VISIT: HCPCS | Performed by: FAMILY MEDICINE

## 2023-06-14 RX ORDER — AZITHROMYCIN 250 MG/1
TABLET, FILM COATED ORAL
Qty: 6 TABLET | Refills: 0 | Status: SHIPPED | OUTPATIENT
Start: 2023-06-14 | End: 2023-06-18

## 2023-06-14 NOTE — PATIENT INSTRUCTIONS
Medicare Preventive Visit Patient Instructions  Thank you for completing your Welcome to Medicare Visit or Medicare Annual Wellness Visit today  Your next wellness visit will be due in one year (6/14/2024)  The screening/preventive services that you may require over the next 5-10 years are detailed below  Some tests may not apply to you based off risk factors and/or age  Screening tests ordered at today's visit but not completed yet may show as past due  Also, please note that scanned in results may not display below  Preventive Screenings:  Service Recommendations Previous Testing/Comments   Colorectal Cancer Screening  * Colonoscopy    * Fecal Occult Blood Test (FOBT)/Fecal Immunochemical Test (FIT)  * Fecal DNA/Cologuard Test  * Flexible Sigmoidoscopy Age: 39-70 years old   Colonoscopy: every 10 years (may be performed more frequently if at higher risk)  OR  FOBT/FIT: every 1 year  OR  Cologuard: every 3 years  OR  Sigmoidoscopy: every 5 years  Screening may be recommended earlier than age 39 if at higher risk for colorectal cancer  Also, an individualized decision between you and your healthcare provider will decide whether screening between the ages of 74-80 would be appropriate  Colonoscopy: 12/14/2021  FOBT/FIT: Not on file  Cologuard: Not on file  Sigmoidoscopy: Not on file    Screening Current     Breast Cancer Screening Age: 36 years old  Frequency: every 1-2 years  Not required if history of left and right mastectomy Mammogram: 12/08/2020        Cervical Cancer Screening Between the ages of 21-29, pap smear recommended once every 3 years  Between the ages of 33-67, can perform pap smear with HPV co-testing every 5 years     Recommendations may differ for women with a history of total hysterectomy, cervical cancer, or abnormal pap smears in past  Pap Smear: 10/20/2017    Screening Not Indicated   Hepatitis C Screening Once for adults born between 1945 and 1965  More frequently in patients at high risk for Hepatitis C Hep C Antibody: 09/25/2019    Screening Current   Diabetes Screening 1-2 times per year if you're at risk for diabetes or have pre-diabetes Fasting glucose: 116 mg/dL (6/12/2023)  A1C: 5 5 % of total Hgb (12/6/2022)  Screening Current   Cholesterol Screening Once every 5 years if you don't have a lipid disorder  May order more often based on risk factors  Lipid panel: 06/12/2023    Screening Not Indicated  History Lipid Disorder     Other Preventive Screenings Covered by Medicare:  1  Abdominal Aortic Aneurysm (AAA) Screening: covered once if your at risk  You're considered to be at risk if you have a family history of AAA  2  Lung Cancer Screening: covers low dose CT scan once per year if you meet all of the following conditions: (1) Age 50-69; (2) No signs or symptoms of lung cancer; (3) Current smoker or have quit smoking within the last 15 years; (4) You have a tobacco smoking history of at least 20 pack years (packs per day multiplied by number of years you smoked); (5) You get a written order from a healthcare provider  3  Glaucoma Screening: covered annually if you're considered high risk: (1) You have diabetes OR (2) Family history of glaucoma OR (3)  aged 48 and older OR (3)  American aged 72 and older  3  Osteoporosis Screening: covered every 2 years if you meet one of the following conditions: (1) You're estrogen deficient and at risk for osteoporosis based off medical history and other findings; (2) Have a vertebral abnormality; (3) On glucocorticoid therapy for more than 3 months; (4) Have primary hyperparathyroidism; (5) On osteoporosis medications and need to assess response to drug therapy  · Last bone density test (DXA Scan): 02/05/2021   5  HIV Screening: covered annually if you're between the age of 15-65  Also covered annually if you are younger than 13 and older than 72 with risk factors for HIV infection   For pregnant patients, it is covered up to 3 times per pregnancy  Immunizations:  Immunization Recommendations   Influenza Vaccine Annual influenza vaccination during flu season is recommended for all persons aged >= 6 months who do not have contraindications   Pneumococcal Vaccine   * Pneumococcal conjugate vaccine = PCV13 (Prevnar 13), PCV15 (Vaxneuvance), PCV20 (Prevnar 20)  * Pneumococcal polysaccharide vaccine = PPSV23 (Pneumovax) Adults 25-60 years old: 1-3 doses may be recommended based on certain risk factors  Adults 72 years old: 1-2 doses may be recommended based off what pneumonia vaccine you previously received   Hepatitis B Vaccine 3 dose series if at intermediate or high risk (ex: diabetes, end stage renal disease, liver disease)   Tetanus (Td) Vaccine - COST NOT COVERED BY MEDICARE PART B Following completion of primary series, a booster dose should be given every 10 years to maintain immunity against tetanus  Td may also be given as tetanus wound prophylaxis  Tdap Vaccine - COST NOT COVERED BY MEDICARE PART B Recommended at least once for all adults  For pregnant patients, recommended with each pregnancy  Shingles Vaccine (Shingrix) - COST NOT COVERED BY MEDICARE PART B  2 shot series recommended in those aged 48 and above     Health Maintenance Due:      Topic Date Due   • Breast Cancer Screening: Mammogram  12/08/2022   • Colorectal Cancer Screening  12/14/2026   • Hepatitis C Screening  Completed     Immunizations Due:      Topic Date Due   • COVID-19 Vaccine (4 - Pfizer series) 01/06/2022   • Influenza Vaccine (Season Ended) 09/01/2023     Advance Directives   What are advance directives? Advance directives are legal documents that state your wishes and plans for medical care  These plans are made ahead of time in case you lose your ability to make decisions for yourself  Advance directives can apply to any medical decision, such as the treatments you want, and if you want to donate organs     What are the types of advance directives? There are many types of advance directives, and each state has rules about how to use them  You may choose a combination of any of the following:  · Living will: This is a written record of the treatment you want  You can also choose which treatments you do not want, which to limit, and which to stop at a certain time  This includes surgery, medicine, IV fluid, and tube feedings  · Durable power of  for healthcare [facility] [place]): This is a written record that states who you want to make healthcare choices for you when you are unable to make them for yourself  This person, called a proxy, is usually a family member or a friend  You may choose more than 1 proxy  · Do not resuscitate (DNR) order:  A DNR order is used in case your heart stops beating or you stop breathing  It is a request not to have certain forms of treatment, such as CPR  A DNR order may be included in other types of advance directives  · Medical directive: This covers the care that you want if you are in a coma, near death, or unable to make decisions for yourself  You can list the treatments you want for each condition  Treatment may include pain medicine, surgery, blood transfusions, dialysis, IV or tube feedings, and a ventilator (breathing machine)  · Values history: This document has questions about your views, beliefs, and how you feel and think about life  This information can help others choose the care that you would choose  Why are advance directives important? An advance directive helps you control your care  Although spoken wishes may be used, it is better to have your wishes written down  Spoken wishes can be misunderstood, or not followed  Treatments may be given even if you do not want them  An advance directive may make it easier for your family to make difficult choices about your care  Urinary Incontinence   Urinary incontinence (UI)  is when you lose control of your bladder   UI develops because your bladder cannot store or empty urine properly  The 3 most common types of UI are stress incontinence, urge incontinence, or both  Medicines:   · May be given to help strengthen your bladder control  Report any side effects of medication to your healthcare provider  Do pelvic muscle exercises often:  Your pelvic muscles help you stop urinating  Squeeze these muscles tight for 5 seconds, then relax for 5 seconds  Gradually work up to squeezing for 10 seconds  Do 3 sets of 15 repetitions a day, or as directed  This will help strengthen your pelvic muscles and improve bladder control  Train your bladder:  Go to the bathroom at set times, such as every 2 hours, even if you do not feel the urge to go  You can also try to hold your urine when you feel the urge to go  For example, hold your urine for 5 minutes when you feel the urge to go  As that becomes easier, hold your urine for 10 minutes  Self-care:   · Keep a UI record  Write down how often you leak urine and how much you leak  Make a note of what you were doing when you leaked urine  · Drink liquids as directed  You may need to limit the amount of liquid you drink to help control your urine leakage  Do not drink any liquid right before you go to bed  Limit or do not have drinks that contain caffeine or alcohol  · Prevent constipation  Eat a variety of high-fiber foods  Good examples are high-fiber cereals, beans, vegetables, and whole-grain breads  Walking is the best way to trigger your intestines to have a bowel movement  · Exercise regularly and maintain a healthy weight  Weight loss and exercise will decrease pressure on your bladder and help you control your leakage  · Use a catheter as directed  to help empty your bladder  A catheter is a tiny, plastic tube that is put into your bladder to drain your urine  · Go to behavior therapy as directed  Behavior therapy may be used to help you learn to control your urge to urinate      Weight Management Why it is important to manage your weight:  Being overweight increases your risk of health conditions such as heart disease, high blood pressure, type 2 diabetes, and certain types of cancer  It can also increase your risk for osteoarthritis, sleep apnea, and other respiratory problems  Aim for a slow, steady weight loss  Even a small amount of weight loss can lower your risk of health problems  How to lose weight safely:  A safe and healthy way to lose weight is to eat fewer calories and get regular exercise  You can lose up about 1 pound a week by decreasing the number of calories you eat by 500 calories each day  Healthy meal plan for weight management:  A healthy meal plan includes a variety of foods, contains fewer calories, and helps you stay healthy  A healthy meal plan includes the following:  · Eat whole-grain foods more often  A healthy meal plan should contain fiber  Fiber is the part of grains, fruits, and vegetables that is not broken down by your body  Whole-grain foods are healthy and provide extra fiber in your diet  Some examples of whole-grain foods are whole-wheat breads and pastas, oatmeal, brown rice, and bulgur  · Eat a variety of vegetables every day  Include dark, leafy greens such as spinach, kale, jah greens, and mustard greens  Eat yellow and orange vegetables such as carrots, sweet potatoes, and winter squash  · Eat a variety of fruits every day  Choose fresh or canned fruit (canned in its own juice or light syrup) instead of juice  Fruit juice has very little or no fiber  · Eat low-fat dairy foods  Drink fat-free (skim) milk or 1% milk  Eat fat-free yogurt and low-fat cottage cheese  Try low-fat cheeses such as mozzarella and other reduced-fat cheeses  · Choose meat and other protein foods that are low in fat  Choose beans or other legumes such as split peas or lentils  Choose fish, skinless poultry (chicken or turkey), or lean cuts of red meat (beef or pork)   Before "you cook meat or poultry, cut off any visible fat  · Use less fat and oil  Try baking foods instead of frying them  Add less fat, such as margarine, sour cream, regular salad dressing and mayonnaise to foods  Eat fewer high-fat foods  Some examples of high-fat foods include french fries, doughnuts, ice cream, and cakes  · Eat fewer sweets  Limit foods and drinks that are high in sugar  This includes candy, cookies, regular soda, and sweetened drinks  Exercise:  Exercise at least 30 minutes per day on most days of the week  Some examples of exercise include walking, biking, dancing, and swimming  You can also fit in more physical activity by taking the stairs instead of the elevator or parking farther away from stores  Ask your healthcare provider about the best exercise plan for you  Alcohol Use and Your Health    Drinking too much can harm your health  Excessive alcohol use leads to about 88,000 death in the [place] each year, and shortens the life of those who diet by almost 30 years  Further, excessive drinking cost the economy $249 billion in 2010  Most excessive drinkers are not alcohol dependent  Excessive alcohol use has immediate effects that increase the risk of many harmful health conditions  These are most often the result of binge drinking  Over time, excessive alcohol use can lead to the development of chronic diseases and other series health problems  What is considered a \"drink\"? Excessive alcohol use includes:  · Binge Drinking: For women, 4 or more drinks consumed on one occasion  For men, 5 or more drinks consumed on one occasion  · Heavy Drinking: For women, 8 or more drinks per week   For men, 15 or more drinks per week  · Any alcohol used by pregnant women  · Any alcohol used by those under the age of 21 years    If you choose to drink, do so in moderation:  · Do not drink at all if you are under the age of 24, or if you are or may be pregnant, or have health " problems that could be made worse by drinking  · For women, up to 1 drink per day  · For men, up to 2 drinks a day    No one should begin drinking or drink more frequently based on potential health benefits    Short-Term Health Risks:  · Injuries: motor vehicle crashes, falls, drownings, burns  · Violence: homicide, suicide, sexual assault, intimate partner violence  · Alcohol poisoning  · Reproductive health: risky sexual behaviors, unintended prengnacy, sexually transmitted diseases, miscarriage, stillbirth, fetal alcohol syndrome    Long-Term Health Risks:  · Chronic diseases: high blood pressure, heart disease, stroke, liver disease, digestive problems  · Cancers: breast, mouth and throat, liver, colon  · Learning and memory problems: dementia, poor school performance  · Mental health: depression, anxiety, insomnia  · Social problems: lost productivity, family problems, unemployment  · Alcohol dependence    For support and more information:  · [facility] and [facility] [address] [place]  Web Address: https://Sync.ME/    · Alcoholics Anonymous        Web Address: http://www singh info/    https://www cdc gov/alcohol/fact-sheets/alcohol-use htm     © Copyright [employer] 2018 Information is for End User's use only and may not be sold, redistributed or otherwise used for commercial purposes   All illustrations and images included in CareNotes® are the copyrighted property of [employer]  or [address] [place] [address]

## 2023-06-14 NOTE — PROGRESS NOTES
Assessment and Plan:     Problem List Items Addressed This Visit        Endocrine    Hypothyroidism     Continue Levothyroxine 112 mcg daily  Relevant Orders    TSH, 3rd generation with Free T4 reflex       Respiratory    Allergic rhinitis     Take Fexofenadine 180 mg daily  Use Flonase nasal spray daily  Mild intermittent asthma without complication     Symptoms are stable  Use Ventolin HFA inhaler as needed  Acute non-recurrent maxillary sinusitis     Will start Zithromax  for 5 days  Take Mucinex 600 mg twice daily  Use Flonase nasal spray daily  Recommended saline sinus rinse  Call office if symptoms persist or worsen  Relevant Medications    azithromycin (ZITHROMAX) 250 mg tablet       Cardiovascular and Mediastinum    Hypertension - Primary     BP on retake 122/82  Continue Losartan/HCTZ 100/12 5 mg daily  Relevant Orders    Comprehensive metabolic panel       Nervous and Auditory    Peripheral neuropathy     Continue Gabapentin 300 mg twice daily  Other    Hyperlipidemia     Lipid panel has improved  ContinueRosuvastatin 5 mg daily, Omega-3  Relevant Orders    Comprehensive metabolic panel    Lipid panel    Medicare annual wellness visit, subsequent    Obesity (BMI 30 0-34  9)     Recommended regular exercise, work on dietary modifications, losing weight  Hypokalemia     Potassium  3 3  Recommended to increase dietary potassium intake: bananas, oranges/orange juice  Recheck potassium level in 2 weeks  Relevant Orders    Potassium     BMI Counseling: Body mass index is 32 38 kg/m²   The BMI is above normal  Nutrition recommendations include decreasing portion sizes, encouraging healthy choices of fruits and vegetables, decreasing fast food intake, consuming healthier snacks, limiting drinks that contain sugar, moderation in carbohydrate intake, increasing intake of lean protein, reducing intake of saturated and trans fat and reducing intake of cholesterol  Exercise recommendations include exercising 3-5 times per week  Rationale for BMI follow-up plan is due to patient being overweight or obese  Depression Screening and Follow-up Plan: Patient was screened for depression during today's encounter  They screened negative with a PHQ-2 score of 0  Preventive health issues were discussed with patient, and age appropriate screening tests were ordered as noted in patient's After Visit Summary  Personalized health advice and appropriate referrals for health education or preventive services given if needed, as noted in patient's After Visit Summary  Schedule follow-up visit in 6 months  Check labs prior to next visit  Schedule screening mammogram        History of Present Illness:     Patient presents for a Medicare Wellness Visit    HPI      Patient presents for 6 month follow-up visit, Medicare AW  PMHx: HTN, IFG, Obesity, Hypothyroidism, Hyperlipidemia, GERD, AR, Asthma, peripheral neuropathy, OA      Reviewed current medications, blood test results from 6/12/23  Fasting blood sugar 116, creatinine 0 78, potassium 3 3, LFTs - within normal range  138, HDL 59, LDL 60  Cholesterol 157, HDL 60, LDL 74 ( improved from 141 in December 2022)  HTN - blood pressure remains stable  Patient takes Losartan/HCTZ 100/12 5 mg daily  Denies chest pain, shortness of breath, dizziness  Hyperlipidemia - improved after starting on Crestor 5 mg daily  Reports no side effects  Family history is positive for stroke in her mother  Patient c/o worsening nasal congestion, postnasal drip, sinus pressure for the past week  Takes Mucinex PRN, using Flonase nasal spray daily  Asthma - symptoms are stable  CT of the chest done in 12/2022 showed 3 mm right upper lobe pulmonary nodule with additional tiny apical nodules  Patient is a former smoker  uit smoking 20 years ago      Discussed scheduling follow-up CT in December this year  Patient did not schedule mammogram as ordered in December 2022  Colonoscopy done by colorectal surgeon Dr Mone Hsu in December 2021, 1 polyp was removed  Dr Mone Hsu recommended to repeat colonoscopy in 5 years  Family history is positive for colon CA in her half brother  Patient Care Team:  Marjorie Day MD as PCP - General  Marjorie Day MD as PCP - [address] [place] [address] (RTE)     Review of Systems:     Review of Systems   Constitutional: Positive for fatigue  Negative for activity change, appetite change, chills and fever  HENT: Positive for congestion and sinus pressure  Negative for ear pain, hearing loss, nosebleeds, sore throat and trouble swallowing  Eyes: Negative  Respiratory: Positive for cough (occasional)  Negative for chest tightness, shortness of breath and wheezing  Cardiovascular: Negative for chest pain, palpitations and leg swelling  Gastrointestinal: Negative for abdominal pain, constipation, diarrhea, nausea and vomiting  Genitourinary: Negative for difficulty urinating, dysuria and hematuria  Musculoskeletal: Positive for arthralgias (arthritic pain in knees)  Negative for back pain and joint swelling  Skin: Negative for rash  Neurological: Positive for numbness (in feet) and headaches  Negative for dizziness and syncope  Hematological: Negative  Psychiatric/Behavioral: Negative for dysphoric mood and sleep disturbance  The patient is not nervous/anxious  Problem List:     Patient Active Problem List   Diagnosis   • Allergic rhinitis   • Diverticulosis   • Esophageal reflux   • Hyperlipidemia   • Hypertension   • Hypothyroidism   • Impaired fasting glucose   • Mild intermittent asthma without complication   • Seborrheic keratosis   • Medicare annual wellness visit, subsequent   • Obesity (BMI 30 0-34  9)   • Peripheral neuropathy   • Seborrheic keratosis, inflamed   • Dizziness   • Right hip pain   • Acute non-recurrent maxillary sinusitis   • Hypokalemia      Past Medical and Surgical History:     Past Medical History:   Diagnosis Date   • Abnormal EKG     last assessed 4/29/16   • Chest skin lesion     last assessed 5/6/16   • Depression with anxiety     last assessed 7/23/12   • Hearing loss     last assessed 7/23/12     Past Surgical History:   Procedure Laterality Date   • COLONOSCOPY      fiberoptic 2012   • CYSTOSCOPY     • HYSTERECTOMY      age 39   • LUMBAR LAMINECTOMY        Family History:     Family History   Problem Relation Age of Onset   • Coronary artery disease Mother    • Hypertension Mother    • Irritable bowel syndrome Mother    • Lung cancer Father    • Hypertension Father    • Other Father         elevated cholesterol   • Colon cancer Half-Brother    • Colon cancer Family    • Diabetes Family    • Ovarian cancer Sister    • No Known Problems Daughter    • Diabetes Maternal Grandmother    • Lymphoma Maternal Grandfather    • No Known Problems Paternal Grandmother    • No Known Problems Paternal Grandfather    • Cervical cancer Sister    • No Known Problems Son    • No Known Problems Half-Brother       Social History:     Social History     Socioeconomic History   • Marital status:       Spouse name: None   • Number of children: 2   • Years of education: None   • Highest education level: None   Occupational History   • None   Tobacco Use   • Smoking status: Former   • Smokeless tobacco: Never   Vaping Use   • Vaping Use: Never used   Substance and Sexual Activity   • Alcohol use: Yes     Comment: 3 glasses wine/week   • Drug use: Never   • Sexual activity: None   Other Topics Concern   • None   Social History Narrative   • None     Social Determinants of Health     Financial Resource Strain: Low Risk  (6/11/2023)    Overall Financial Resource Strain (CARDIA)    • Difficulty of Paying Living Expenses: Not hard at all   Food Insecurity: Not on file   Transportation Needs: No Transportation Needs (6/11/2023)    PRAPARE - Transportation    • Lack of Transportation (Medical): No    • Lack of Transportation (Non-Medical):  No   Physical Activity: Not on file   Stress: Not on file   Social Connections: Not on file   Intimate Partner Violence: Not on file   Housing Stability: Not on file      Medications and Allergies:     Current Outpatient Medications   Medication Sig Dispense Refill   • albuterol (Ventolin HFA) 90 mcg/act inhaler Inhale 2 puffs every 4 (four) hours as needed for wheezing 18 g 1   • ascorbic acid (VITAMIN C) 500 mg tablet Take by mouth     • aspirin (ECOTRIN LOW STRENGTH) 81 mg EC tablet Take 1 tablet by mouth daily     • azithromycin (ZITHROMAX) 250 mg tablet Take 2 tablets 1 st day, then 1 tablet daily for 4 days, then stop 6 tablet 0   • Calcium Carbonate-Vitamin D (CALCIUM 600+D) 600-400 MG-UNIT per tablet Take 1 tablet by mouth daily     • fexofenadine (ALLEGRA) 180 MG tablet Take 180 mg by mouth daily     • gabapentin (NEURONTIN) 300 mg capsule TAKE 1 CAPSULE TWICE A  capsule 3   • levothyroxine 112 mcg tablet TAKE 1 TABLET DAILY 90 tablet 3   • losartan-hydrochlorothiazide (HYZAAR) 100-12 5 MG per tablet TAKE 1 TABLET DAILY 90 tablet 3   • nystatin (MYCOSTATIN) powder Apply topically twice daily as directed 60 g 3   • Omega-3 Fatty Acids (FISH OIL) 1200 MG CAPS Take 1 capsule by mouth 2 (two) times a day     • rosuvastatin (CRESTOR) 5 mg tablet take 1 tablet by mouth once daily 90 tablet 5   • cyanocobalamin (VITAMIN B-12) 1,000 mcg tablet Take by mouth daily (Patient not taking: Reported on 6/14/2023)     • Diclofenac Sodium (VOLTAREN) 1 % Apply 2 g topically 4 (four) times a day (Patient not taking: Reported on 6/14/2023) 50 g 0     Current Facility-Administered Medications   Medication Dose Route Frequency Provider Last Rate Last Admin   • acetaminophen (TYLENOL) tablet 650 mg  650 mg Oral Q6H PRN Sridhar Pride DO   650 mg at 06/17/22 1026   • ibuprofen (MOTRIN) tablet 600 mg  600 mg Oral Q6H PRN Caryle Bur Dewar, DO   600 mg at 06/17/22 1027     No Known Allergies   Immunizations:     Immunization History   Administered Date(s) Administered   • COVID-19 PFIZER VACCINE 0 3 ML IM 03/11/2021, 04/03/2021, 11/11/2021   • Influenza, high dose seasonal 0 7 mL 09/28/2018, 10/01/2019, 10/01/2020, 10/22/2021   • Influenza, seasonal, injectable 11/29/2013, 10/10/2014   • Pneumococcal Conjugate 13-Valent 04/29/2016   • Pneumococcal Conjugate Vaccine 20-valent (Pcv20), Polysace 04/25/2022   • Pneumococcal Polysaccharide PPV23 08/12/2011      Health Maintenance:         Topic Date Due   • Breast Cancer Screening: Mammogram  12/08/2022   • Colorectal Cancer Screening  12/14/2026   • Hepatitis C Screening  Completed         Topic Date Due   • COVID-19 Vaccine (4 - Pfizer series) 01/06/2022   • Influenza Vaccine (Season Ended) 09/01/2023      Medicare Screening Tests and Risk Assessments:     Jong Meek is here for her Subsequent Wellness visit  Health Risk Assessment:   Patient rates overall health as very good  Patient feels that their physical health rating is same  Patient is very satisfied with their life  Eyesight was rated as same  Hearing was rated as same  Patient feels that their emotional and mental health rating is much better  Patients states they are never, rarely angry  Patient states they are sometimes unusually tired/fatigued  Pain experienced in the last 7 days has been some  Patient's pain rating has been 5/10  Patient states that she has experienced no weight loss or gain in last 6 months  Depression Screening:   PHQ-2 Score: 0      Fall Risk Screening: In the past year, patient has experienced: no history of falling in past year      Urinary Incontinence Screening:   Patient has leaked urine accidently in the last six months  Home Safety:  Patient has trouble with stairs inside or outside of their home   Patient has working smoke alarms and has no working carbon monoxide detector  Home safety hazards include: none  Nutrition:   Current diet is Regular  Medications:   Patient is currently taking over-the-counter supplements  OTC medications include: See medication list for vitamins  Patient is able to manage medications  Activities of Daily Living (ADLs)/Instrumental Activities of Daily Living (IADLs):   Walk and transfer into and out of bed and chair?: Yes  Dress and groom yourself?: Yes    Bathe or shower yourself?: Yes    Feed yourself? Yes  Do your laundry/housekeeping?: Yes  Manage your money, pay your bills and track your expenses?: Yes  Make your own meals?: Yes    Do your own shopping?: Yes    Previous Hospitalizations:   Any hospitalizations or ED visits within the last 12 months?: No      Advance Care Planning:   Living will: Yes    Durable POA for healthcare: Yes    Advanced directive: Yes    Advanced directive counseling given: Yes      Cognitive Screening:   Provider or family/friend/caregiver concerned regarding cognition?: No    PREVENTIVE SCREENINGS      Cardiovascular Screening:    General: Screening Not Indicated and History Lipid Disorder      Diabetes Screening:     General: Screening Current      Colorectal Cancer Screening:     General: Screening Current      Breast Cancer Screening:     General: Risks and Benefits Discussed    Due for: Mammogram        Cervical Cancer Screening:    General: Screening Not Indicated      Abdominal Aortic Aneurysm (AAA) Screening:        General: Screening Not Indicated      Lung Cancer Screening:     General: Screening Not Indicated      Hepatitis C Screening:    General: Screening Current    Screening, Brief Intervention, and Referral to Treatment (SBIRT)    Screening  Typical number of drinks in a day: 0  Typical number of drinks in a week: 2  Interpretation: Low risk drinking behavior      AUDIT-C Screenin) How often did you have a drink containing alcohol in the past year? 2 to 3 times a "week  2) How many drinks did you have on a typical day when you were drinking in the past year? 0  3) How often did you have 6 or more drinks on one occasion in the past year? never    AUDIT-C Score: 3  Interpretation: Score 3-12 (female): POSITIVE screen for alcohol misuse    AUDIT Screenin) How often during the last year have you found that you were not able to stop drinking once you had started? 0 - never  5) How often during the last year have you failed to do what was normally expected from you because of drinking? 0 - never  6) How often during the last year have you needed a first drink in the morning to get yourself going after a heavy drinking session? 0 - never  7) How often during the last year have you had a feeling of guilt or remorse after drinking? 0 - never  8) How often during the last year have you been unable to remember what happened the night before because you had been drinking? 0 - never  9) Have you or someone else been injured as a result of your drinking? 0 - no  10) Has a relative or friend or a doctor or another health worker been concerned about your drinking or suggested you cut down? 0 - no    AUDIT Score: 3  Interpretation: Low risk alcohol consumption    Single Item Drug Screening:  How often have you used an illegal drug (including marijuana) or a prescription medication for non-medical reasons in the past year? never    Single Item Drug Screen Score: 0  Interpretation: Negative screen for possible drug use disorder    Other Counseling Topics:   Car/seat belt/driving safety, skin self-exam and calcium and vitamin D intake and regular weightbearing exercise  No results found  Physical Exam:     /82 (BP Location: Left arm, Patient Position: Sitting, Cuff Size: Standard)   Pulse 62   Temp (!) 97 2 °F (36 2 °C) (Temporal)   Resp 12   Ht 5' 6\" (1 676 m)   Wt 91 kg (200 lb 9 6 oz)   SpO2 95%   BMI 32 38 kg/m²     Physical Exam  Vitals and nursing note reviewed   " Constitutional:       Appearance: Normal appearance  She is obese  HENT:      Head: Normocephalic and atraumatic  Nose: Congestion present  Eyes:      Conjunctiva/sclera: Conjunctivae normal       Pupils: Pupils are equal, round, and reactive to light  Neck:      Vascular: No carotid bruit  Cardiovascular:      Rate and Rhythm: Normal rate and regular rhythm  Heart sounds: No murmur heard  Pulmonary:      Effort: Pulmonary effort is normal       Breath sounds: Normal breath sounds  Abdominal:      General: There is no distension  Palpations: Abdomen is soft  Tenderness: There is no abdominal tenderness  Musculoskeletal:         General: No swelling  Normal range of motion  Cervical back: Normal range of motion and neck supple  Right lower leg: No edema  Left lower leg: No edema  Skin:     General: Skin is warm and dry  Neurological:      General: No focal deficit present  Mental Status: She is alert and oriented to person, place, and time  Motor: No weakness        Gait: Gait normal    Psychiatric:         Mood and Affect: Mood normal           Laxmi Crabtree MD

## 2023-06-15 NOTE — ASSESSMENT & PLAN NOTE
Will start Zithromax  for 5 days  Take Mucinex 600 mg twice daily  Use Flonase nasal spray daily  Recommended saline sinus rinse  Call office if symptoms persist or worsen

## 2023-06-15 NOTE — ASSESSMENT & PLAN NOTE
Potassium  3 3  Recommended to increase dietary potassium intake: bananas, oranges/orange juice  Recheck potassium level in 2 weeks

## 2023-06-22 ENCOUNTER — VBI (OUTPATIENT)
Dept: ADMINISTRATIVE | Facility: OTHER | Age: 73
End: 2023-06-22

## 2023-07-03 ENCOUNTER — TELEPHONE (OUTPATIENT)
Dept: FAMILY MEDICINE CLINIC | Facility: CLINIC | Age: 73
End: 2023-07-03

## 2023-07-03 ENCOUNTER — APPOINTMENT (OUTPATIENT)
Dept: LAB | Facility: CLINIC | Age: 73
End: 2023-07-03
Payer: COMMERCIAL

## 2023-07-03 DIAGNOSIS — E87.6 HYPOKALEMIA: ICD-10-CM

## 2023-07-03 LAB — POTASSIUM SERPL-SCNC: 3.6 MMOL/L (ref 3.5–5.3)

## 2023-07-03 PROCEDURE — 36415 COLL VENOUS BLD VENIPUNCTURE: CPT

## 2023-07-03 PROCEDURE — 84132 ASSAY OF SERUM POTASSIUM: CPT

## 2023-07-03 NOTE — TELEPHONE ENCOUNTER
Pt aware----- Message from Henrik Rose MD sent at 7/3/2023  4:06 PM EDT -----  Please call patient. Potassium level is back to normal range. Recommend to continue current blood pressure medication. Keep sufficient dietary potassium intake.

## 2023-07-17 DIAGNOSIS — G62.9 PERIPHERAL POLYNEUROPATHY: ICD-10-CM

## 2023-07-17 DIAGNOSIS — E03.9 ACQUIRED HYPOTHYROIDISM: ICD-10-CM

## 2023-07-17 RX ORDER — LEVOTHYROXINE SODIUM 112 UG/1
TABLET ORAL
Qty: 90 TABLET | Refills: 3 | Status: SHIPPED | OUTPATIENT
Start: 2023-07-17

## 2023-07-17 RX ORDER — GABAPENTIN 300 MG/1
CAPSULE ORAL
Qty: 180 CAPSULE | Refills: 3 | Status: SHIPPED | OUTPATIENT
Start: 2023-07-17

## 2023-07-20 ENCOUNTER — VBI (OUTPATIENT)
Dept: ADMINISTRATIVE | Facility: OTHER | Age: 73
End: 2023-07-20

## 2023-08-13 PROBLEM — J01.00 ACUTE NON-RECURRENT MAXILLARY SINUSITIS: Status: RESOLVED | Noted: 2023-06-14 | Resolved: 2023-08-13

## 2023-11-13 ENCOUNTER — VBI (OUTPATIENT)
Dept: ADMINISTRATIVE | Facility: OTHER | Age: 73
End: 2023-11-13